# Patient Record
Sex: MALE | Employment: OTHER | URBAN - METROPOLITAN AREA
[De-identification: names, ages, dates, MRNs, and addresses within clinical notes are randomized per-mention and may not be internally consistent; named-entity substitution may affect disease eponyms.]

---

## 2018-02-12 RX ORDER — FLUTICASONE PROPIONATE 50 MCG
1 SPRAY, SUSPENSION (ML) NASAL DAILY
COMMUNITY
End: 2018-02-12 | Stop reason: ALTCHOICE

## 2018-02-12 RX ORDER — LOSARTAN POTASSIUM 50 MG/1
25 TABLET ORAL DAILY
COMMUNITY
End: 2018-07-18 | Stop reason: SDUPTHER

## 2018-02-12 RX ORDER — FUROSEMIDE 20 MG/1
20 TABLET ORAL DAILY
COMMUNITY
End: 2018-03-27 | Stop reason: SDUPTHER

## 2018-02-12 RX ORDER — PREDNISONE 10 MG/1
TABLET ORAL DAILY
COMMUNITY
End: 2018-03-15 | Stop reason: SDUPTHER

## 2018-02-12 RX ORDER — AZATHIOPRINE 50 MG/1
50 TABLET ORAL 3 TIMES DAILY
COMMUNITY
End: 2019-06-07 | Stop reason: ALTCHOICE

## 2018-02-12 RX ORDER — ZINC GLUCONATE 50 MG
50 TABLET ORAL DAILY
COMMUNITY
End: 2019-11-07 | Stop reason: ALTCHOICE

## 2018-02-12 RX ORDER — MULTIVITAMIN
1 CAPSULE ORAL DAILY
COMMUNITY
End: 2019-06-07 | Stop reason: ALTCHOICE

## 2018-02-12 RX ORDER — CETIRIZINE HYDROCHLORIDE 10 MG/1
10 TABLET ORAL DAILY
COMMUNITY

## 2018-02-21 ENCOUNTER — ANESTHESIA EVENT (OUTPATIENT)
Dept: PERIOP | Facility: HOSPITAL | Age: 73
End: 2018-02-21
Payer: COMMERCIAL

## 2018-02-21 ENCOUNTER — ANESTHESIA (OUTPATIENT)
Dept: PERIOP | Facility: HOSPITAL | Age: 73
End: 2018-02-21
Payer: COMMERCIAL

## 2018-02-21 ENCOUNTER — HOSPITAL ENCOUNTER (OUTPATIENT)
Facility: HOSPITAL | Age: 73
Setting detail: OUTPATIENT SURGERY
Discharge: HOME | End: 2018-02-21
Attending: INTERNAL MEDICINE | Admitting: INTERNAL MEDICINE
Payer: COMMERCIAL

## 2018-02-21 VITALS
DIASTOLIC BLOOD PRESSURE: 77 MMHG | HEART RATE: 84 BPM | SYSTOLIC BLOOD PRESSURE: 123 MMHG | HEIGHT: 70 IN | BODY MASS INDEX: 37.08 KG/M2 | OXYGEN SATURATION: 95 % | WEIGHT: 259 LBS | RESPIRATION RATE: 22 BRPM | TEMPERATURE: 97.6 F

## 2018-02-21 DIAGNOSIS — R19.5 OTHER FECAL ABNORMALITIES: ICD-10-CM

## 2018-02-21 PROCEDURE — 88305 TISSUE EXAM BY PATHOLOGIST: CPT | Performed by: PATHOLOGY

## 2018-02-21 PROCEDURE — 88305 TISSUE EXAM BY PATHOLOGIST: CPT | Performed by: INTERNAL MEDICINE

## 2018-02-21 RX ORDER — PROPOFOL 10 MG/ML
INJECTION, EMULSION INTRAVENOUS AS NEEDED
Status: DISCONTINUED | OUTPATIENT
Start: 2018-02-21 | End: 2018-02-21 | Stop reason: SURG

## 2018-02-21 RX ORDER — ACETAMINOPHEN 325 MG/1
650 TABLET ORAL EVERY 6 HOURS PRN
COMMUNITY

## 2018-02-21 RX ORDER — SODIUM CHLORIDE 9 MG/ML
20 INJECTION, SOLUTION INTRAVENOUS CONTINUOUS
Status: DISCONTINUED | OUTPATIENT
Start: 2018-02-21 | End: 2018-02-21 | Stop reason: HOSPADM

## 2018-02-21 RX ADMIN — SODIUM CHLORIDE 20 ML/HR: 0.9 INJECTION, SOLUTION INTRAVENOUS at 08:35

## 2018-02-21 RX ADMIN — PROPOFOL 100 MG: 10 INJECTION, EMULSION INTRAVENOUS at 10:00

## 2018-02-21 RX ADMIN — PROPOFOL 50 MG: 10 INJECTION, EMULSION INTRAVENOUS at 10:02

## 2018-02-21 RX ADMIN — PROPOFOL 50 MG: 10 INJECTION, EMULSION INTRAVENOUS at 10:10

## 2018-02-21 RX ADMIN — PROPOFOL 20 MG: 10 INJECTION, EMULSION INTRAVENOUS at 10:23

## 2018-02-21 RX ADMIN — PROPOFOL 50 MG: 10 INJECTION, EMULSION INTRAVENOUS at 10:15

## 2018-02-21 RX ADMIN — PROPOFOL 30 MG: 10 INJECTION, EMULSION INTRAVENOUS at 10:18

## 2018-02-21 RX ADMIN — PROPOFOL 50 MG: 10 INJECTION, EMULSION INTRAVENOUS at 10:05

## 2018-02-21 RX ADMIN — PROPOFOL 20 MG: 10 INJECTION, EMULSION INTRAVENOUS at 10:21

## 2018-02-21 RX ADMIN — PROPOFOL 30 MG: 10 INJECTION, EMULSION INTRAVENOUS at 10:27

## 2018-02-21 NOTE — OP NOTE
**** GI/ENDOSCOPY REPORT ****     PATIENT NAME: Yanni Strickland ------ VISIT ID:  Patient ID:   [id number] YOB: 1945     INTRODUCTION: Colonoscopy - A 68 male patient presents for an outpatient   Colonoscopy at [facility]  PREVIOUS COLONOSCOPY:     INDICATIONS: Fecal occult blood positive (cologuard)  CONSENT:  The benefits, risks, and alternatives to the procedure were   discussed and informed consent was obtained from the patient  PREPARATION: EKG, pulse, pulse oximetry and blood pressure were monitored   throughout the procedure  The patient was identified by myself both   verbally and by visual inspection of ID band  Airway Assessment   Classification: Airway class 2 - Visualization of the soft palate, fauces   and uvula  ASA Classification: See anesthesia record  MEDICATIONS: Anesthesia-check records     PROCEDURE:  The endoscope was passed without difficulty through the anus   under direct visualization and advanced to the cecum, confirmed by   appendiceal orifice and ileocecal valve  The scope was withdrawn and the   mucosa was carefully examined  The quality of the preparation was good  Cecal Intubation Time: 14 minutes(s) Scope Withdrawal Time: 13 minutes(s)     RECTAL EXAM: Normal rectal exam  No masses  FINDINGS:  Four flat and sessile polyps, measuring 5-9 mm in size, were   found in the ascending colon and ileocecal valve  All polyps were   completely removed by snare cautery polypectomy  The polyps were   retrieved  Four sessile polyps, measuring 4-9 mm in size, were found in   the transverse colon and descending colon  All polyps were completely   removed by snare cautery polypectomy  The polyps were retrieved  There was   evidence of mild diverticulosis in the descending colon and sigmoid colon  On retroflexed view, internal hemorrhoids were found  COMPLICATIONS: There were no complications       IMPRESSIONS: Four flat and sessile polyps found in the ascending colon and   ileocecal valve; all polyps removed by snare cautery polypectomy  Four   sessile polyps found in the transverse colon and descending colon; all   polyps removed by snare cautery polypectomy  Mild diverticulosis found in   the descending colon and sigmoid colon  Internal hemorrhoids  RECOMMENDATIONS: Resume regular diet as tolerated  Continue current   medications  Call if any signs or symptoms of abdominal pain, bleeding or   diverticulitis  Colonoscopy recommended in 5 years  ESTIMATED BLOOD LOSS: None  PATHOLOGY SPECIMENS: All polyps completely removed by snare cautery   polypectomy  All polyps completely removed by snare cautery polypectomy  PROCEDURE CODES: : Colonoscopy with snare polypectomy     ICD-9 Codes: 792 1 Nonspecific abnormal findings in stool contents 211 3   Benign neoplasm of colon 211 3 Benign neoplasm of colon 562 10   Diverticulosis of colon (without mention of hemorrhage)     ICD-10 Codes: R19 5 Other fecal abnormalities K63 5 Polyp of colon K63 5   Polyp of colon K57 Diverticular disease of intestine K64 9 Unspecified   hemorrhoids     PERFORMED BY: MARIELLA Pineda  on 02/21/2018  Version 1, electronically signed by MARIELLA Pineda  on 02/21/2018   at 10:43

## 2018-02-21 NOTE — ANESTHESIA PREPROCEDURE EVALUATION
Review of Systems/Medical History  Patient summary reviewed  Chart reviewed      Cardiovascular  Exercise tolerance: poor,  Hypertension controlled,    Pulmonary  Shortness of breath, Oxygen dependent Non Compliant,   Comment: Interstitial lung disease - non compliant with home O2     GI/Hepatic  Negative GI/hepatic ROS   Bowel prep       Negative  ROS        Endo/Other    Obesity  morbid obesity   GYN       Hematology  Negative hematology ROS      Musculoskeletal    Arthritis     Neurology   Psychology   Negative psychology ROS              Physical Exam    Airway    Mallampati score: II  TM Distance: >3 FB  Neck ROM: full     Dental   lower dentures and upper dentures,     Cardiovascular  Rhythm: regular, Rate: normal,     Pulmonary  Decreased breath sounds,     Other Findings        Anesthesia Plan  ASA Score- 3     Anesthesia Type- IV sedation with anesthesia with ASA Monitors  Additional Monitors:   Airway Plan:         Plan Factors-    Induction- intravenous  Postoperative Plan-     Informed Consent- Anesthetic plan and risks discussed with patient

## 2018-03-06 ENCOUNTER — TELEPHONE (OUTPATIENT)
Dept: PULMONOLOGY | Facility: CLINIC | Age: 73
End: 2018-03-06

## 2018-03-15 ENCOUNTER — OFFICE VISIT (OUTPATIENT)
Dept: FAMILY MEDICINE CLINIC | Facility: CLINIC | Age: 73
End: 2018-03-15
Payer: COMMERCIAL

## 2018-03-15 VITALS
DIASTOLIC BLOOD PRESSURE: 78 MMHG | SYSTOLIC BLOOD PRESSURE: 120 MMHG | WEIGHT: 258 LBS | TEMPERATURE: 99 F | HEIGHT: 70 IN | BODY MASS INDEX: 36.94 KG/M2 | HEART RATE: 84 BPM

## 2018-03-15 DIAGNOSIS — I10 ESSENTIAL HYPERTENSION: ICD-10-CM

## 2018-03-15 DIAGNOSIS — J84.9 ILD (INTERSTITIAL LUNG DISEASE) (HCC): Primary | ICD-10-CM

## 2018-03-15 DIAGNOSIS — J06.9 VIRAL URI WITH COUGH: ICD-10-CM

## 2018-03-15 PROBLEM — M19.90 OSTEOARTHRITIS: Status: ACTIVE | Noted: 2018-03-15

## 2018-03-15 PROCEDURE — 3074F SYST BP LT 130 MM HG: CPT | Performed by: FAMILY MEDICINE

## 2018-03-15 PROCEDURE — 3078F DIAST BP <80 MM HG: CPT | Performed by: FAMILY MEDICINE

## 2018-03-15 PROCEDURE — 99204 OFFICE O/P NEW MOD 45 MIN: CPT | Performed by: FAMILY MEDICINE

## 2018-03-15 RX ORDER — PROMETHAZINE HYDROCHLORIDE AND CODEINE PHOSPHATE 6.25; 1 MG/5ML; MG/5ML
5 SYRUP ORAL EVERY 4 HOURS PRN
Qty: 180 ML | Refills: 0 | Status: SHIPPED | OUTPATIENT
Start: 2018-03-15 | End: 2018-07-17 | Stop reason: ALTCHOICE

## 2018-03-15 RX ORDER — DOXYCYCLINE 100 MG/1
100 TABLET ORAL 2 TIMES DAILY
Qty: 20 TABLET | Refills: 0 | Status: SHIPPED | OUTPATIENT
Start: 2018-03-15 | End: 2018-03-25

## 2018-03-15 RX ORDER — PREDNISONE 10 MG/1
TABLET ORAL
Qty: 21 TABLET | Refills: 0 | Status: SHIPPED | OUTPATIENT
Start: 2018-03-15 | End: 2019-06-07 | Stop reason: ALTCHOICE

## 2018-03-15 NOTE — PROGRESS NOTES
Assessment/Plan:     Diagnoses and all orders for this visit:    ILD (interstitial lung disease) ([facility] [address] )  -     doxycycline (ADOXA) 100 MG tablet; Take 1 tablet (100 mg total) by mouth 2 (two) times a day for 10 days  -     predniSONE 10 mg tablet; 6 tabs on Day 1,5 tabs on Day 2,4 tabs on Day 3,3 tabs on Day 4,2 tabs on  Day 5 And 1 tab on Day 6  -     promethazine-codeine (PHENERGAN WITH CODEINE) 6 25-10 mg/5 mL syrup; Take 5 mL by mouth every 4 (four) hours as needed for cough      HTN: -- stable -- cont losartan  Viral URI-- Likely the cause of the issues today  Treatment as above  F/u with pulm  And f/u in 6 months for lakeisha valdez  Subjective:     Chief Complaint   Patient presents with    Cold Like Symptoms     x 3 days    Cough        Patient ID: Kim Rosario is a 68 y o  male  Here for uri symptoms  paintet is oxygen dependent and has ILD   Started 3 days ago  Has severe cough  +congestion   Hoarse voice  Is on long term steroids          The following portions of the patient's history were reviewed and updated as appropriate: allergies, current medications, past family history, past medical history, past social history, past surgical history and problem list     Review of Systems   Constitutional: Negative  HENT: Negative  Eyes: Negative  Respiratory: Negative  Cardiovascular: Negative  Gastrointestinal: Negative  Endocrine: Negative  Genitourinary: Negative  Musculoskeletal: Negative  Skin: Negative  Allergic/Immunologic: Negative  Neurological: Negative  Hematological: Negative  Psychiatric/Behavioral: Negative  All other systems reviewed and are negative          Objective:    Vitals:    03/15/18 1108   BP: 120/78   BP Location: Right arm   Patient Position: Sitting   Cuff Size: Standard   Pulse: 84   Temp: 99 °F (37 2 °C)   TempSrc: Tympanic   Weight: 117 kg (258 lb)   Height: 5' 10" (1 778 m)          Physical Exam   Constitutional: He is oriented to person, place, and time  He appears well-developed and well-nourished  No distress  HENT:   Head: Normocephalic  Right Ear: External ear normal    Left Ear: External ear normal    Nose: Nose normal    Mouth/Throat: Oropharynx is clear and moist    + congestion   Eyes: Conjunctivae and EOM are normal  Pupils are equal, round, and reactive to light  Right eye exhibits no discharge  Left eye exhibits no discharge  Neck: Normal range of motion  Cardiovascular: Normal rate, regular rhythm and normal heart sounds  Pulmonary/Chest: Effort normal and breath sounds normal    On oxygen  Decreased breath sounds b/l   Abdominal: Soft  Bowel sounds are normal  He exhibits no distension  There is no tenderness  Musculoskeletal: Normal range of motion  Neurological: He is alert and oriented to person, place, and time  No cranial nerve deficit  Skin: Skin is warm and dry  No rash noted  Psychiatric: He has a normal mood and affect  His behavior is normal  Judgment and thought content normal    Nursing note and vitals reviewed

## 2018-03-27 ENCOUNTER — OFFICE VISIT (OUTPATIENT)
Dept: FAMILY MEDICINE CLINIC | Facility: CLINIC | Age: 73
End: 2018-03-27
Payer: COMMERCIAL

## 2018-03-27 ENCOUNTER — EVALUATION (OUTPATIENT)
Dept: PHYSICAL THERAPY | Facility: CLINIC | Age: 73
End: 2018-03-27
Payer: COMMERCIAL

## 2018-03-27 ENCOUNTER — TELEPHONE (OUTPATIENT)
Dept: FAMILY MEDICINE CLINIC | Facility: CLINIC | Age: 73
End: 2018-03-27

## 2018-03-27 VITALS
SYSTOLIC BLOOD PRESSURE: 142 MMHG | HEART RATE: 86 BPM | RESPIRATION RATE: 16 BRPM | WEIGHT: 260 LBS | BODY MASS INDEX: 37.22 KG/M2 | DIASTOLIC BLOOD PRESSURE: 84 MMHG | OXYGEN SATURATION: 93 % | HEIGHT: 70 IN

## 2018-03-27 DIAGNOSIS — M54.2 NECK PAIN: Primary | ICD-10-CM

## 2018-03-27 DIAGNOSIS — J84.9 ILD (INTERSTITIAL LUNG DISEASE) (HCC): ICD-10-CM

## 2018-03-27 DIAGNOSIS — R60.9 EDEMA, UNSPECIFIED TYPE: ICD-10-CM

## 2018-03-27 PROCEDURE — 97110 THERAPEUTIC EXERCISES: CPT | Performed by: PHYSICAL THERAPIST

## 2018-03-27 PROCEDURE — 99214 OFFICE O/P EST MOD 30 MIN: CPT | Performed by: FAMILY MEDICINE

## 2018-03-27 PROCEDURE — 97161 PT EVAL LOW COMPLEX 20 MIN: CPT | Performed by: PHYSICAL THERAPIST

## 2018-03-27 PROCEDURE — G8979 MOBILITY GOAL STATUS: HCPCS | Performed by: PHYSICAL THERAPIST

## 2018-03-27 PROCEDURE — G8978 MOBILITY CURRENT STATUS: HCPCS | Performed by: PHYSICAL THERAPIST

## 2018-03-27 RX ORDER — FUROSEMIDE 20 MG/1
20 TABLET ORAL DAILY
Qty: 90 TABLET | Refills: 1 | Status: SHIPPED | OUTPATIENT
Start: 2018-03-27 | End: 2018-09-21 | Stop reason: SDUPTHER

## 2018-03-27 RX ORDER — PREDNISONE 10 MG/1
TABLET ORAL
Qty: 30 TABLET | Refills: 0 | Status: SHIPPED | OUTPATIENT
Start: 2018-03-27 | End: 2018-07-17 | Stop reason: SDUPTHER

## 2018-03-27 RX ORDER — CYCLOBENZAPRINE HCL 10 MG
10 TABLET ORAL
Qty: 14 TABLET | Refills: 0 | Status: SHIPPED | OUTPATIENT
Start: 2018-03-27 | End: 2018-07-17 | Stop reason: ALTCHOICE

## 2018-03-27 RX ORDER — ACETAMINOPHEN AND CODEINE PHOSPHATE 300; 30 MG/1; MG/1
1 TABLET ORAL EVERY 6 HOURS PRN
Qty: 30 TABLET | Refills: 0 | Status: SHIPPED | OUTPATIENT
Start: 2018-03-27 | End: 2018-07-17 | Stop reason: ALTCHOICE

## 2018-03-27 NOTE — PROGRESS NOTES
Assessment/Plan:     Diagnoses and all orders for this visit:    Neck pain  -     predniSONE 10 mg tablet; 4 for 3 days 3 for 3 days 2 for 3 days and 1 for 3 days  -     cyclobenzaprine (FLEXERIL) 10 mg tablet; Take 1 tablet (10 mg total) by mouth daily at bedtime  -     Ambulatory referral to Physical Therapy; Future  -     acetaminophen-codeine (TYLENOL #3) 300-30 mg per tablet; Take 1 tablet by mouth every 6 (six) hours as needed for moderate pain    Edema, unspecified type  -     furosemide (LASIX) 20 mg tablet; Take 1 tablet (20 mg total) by mouth daily    ILD (interstitial lung disease) ([facility] [address] )            Patient has severe neck spasm of his left trapezius  Patient is already on daily prednisone but will increase for a taper dose to back to what he is normally taking  Flexeril at night  Will stop tramadol and start Tylenol with codeine as needed  Called physical therapy and they will see him this afternoon as we will start physical therapy to help loosen up these muscles  Continue treatments for lung disease follow-up pulmonary       Subjective:     Chief Complaint   Patient presents with    Pinched Nerve     in neck for 2 months        Patient ID: Suri Dumas is a 68 y o  male  Here for 1-2 months of left-sided neck and shoulder pain  Feels numbness and tingling down arm  Decreased range of motion of neck  Worse with coughing patient has chronic lung disease and coughs and this is exacerbating the problem  Also read needs refill of Lasix for edema        The following portions of the patient's history were reviewed and updated as appropriate: allergies, current medications, past family history, past medical history, past social history, past surgical history and problem list     Review of Systems   Constitutional: Negative  HENT: Negative  Eyes: Negative  Respiratory: Negative  Cardiovascular: Negative  Gastrointestinal: Negative  Endocrine: Negative  Genitourinary: Negative  Musculoskeletal: Positive for arthralgias and back pain  Skin: Negative  Allergic/Immunologic: Negative  Neurological: Negative  Hematological: Negative  Psychiatric/Behavioral: Negative  All other systems reviewed and are negative  Objective:    Vitals:    03/27/18 0902   BP: 142/84   BP Location: Left arm   Patient Position: Sitting   Cuff Size: Standard   Pulse: 86   Resp: 16   SpO2: 93%   Weight: 118 kg (260 lb)   Height: 5' 10" (1 778 m)          Physical Exam   Constitutional: He is oriented to person, place, and time  He appears well-developed and well-nourished  No distress  HENT:   Head: Normocephalic  Right Ear: External ear normal    Left Ear: External ear normal    Nose: Nose normal    Mouth/Throat: Oropharynx is clear and moist    Eyes: Conjunctivae and EOM are normal  Pupils are equal, round, and reactive to light  Right eye exhibits no discharge  Left eye exhibits no discharge  Neck: Normal range of motion  Cardiovascular: Normal rate, regular rhythm and normal heart sounds  Pulmonary/Chest: Effort normal and breath sounds normal    Abdominal: Soft  Bowel sounds are normal  He exhibits no distension  There is no tenderness  Musculoskeletal: Normal range of motion  Extremely tight and spasms left-sided neck  Decreased range of motion of neck   Neurological: He is alert and oriented to person, place, and time  No cranial nerve deficit  Skin: Skin is warm and dry  No rash noted  Psychiatric: He has a normal mood and affect  His behavior is normal  Judgment and thought content normal    Nursing note and vitals reviewed

## 2018-03-27 NOTE — PROGRESS NOTES
PT Evaluation     Today's date: 3/27/2018  Patient name: Dereck Duran  : 1945  MRN: [id number]  Referring provider: Gary Rodgers DO  Dx:   Encounter Diagnosis     ICD-10-CM    1  Neck pain M54 2 Ambulatory referral to Physical Therapy                  Assessment  Impairments: abnormal muscle firing, abnormal muscle tone, abnormal or restricted ROM, abnormal movement, impaired physical strength, lacks appropriate home exercise program, pain with function and scapular dyskinesis    Assessment details: Patient is a 68y o  year old male presenting to physical therapy with left shoulder/neck pain  Patient presents with pain, decreased strength, decreased ROM, and decreased tolerance to activity  Patient would benefit from skilled physical therapy services to address these impairments and to maximize function  Thank you for the referral     Understanding of Dx/Px/POC: excellent  Goals  Impairment Goals:  1 ) Pt will have decreased sx at rest by 50% in 2-4 weeks  2 ) Pt will have improved cervical active rotation range of motion by 10 degrees B in 4-6 weeks  3 ) Pt will have decreased tenderness to palpation to left UT by 50% in 3-4 weeks  Functional Goals:  1 ) Pt will be independent in their home exercise program in 1 week  2 ) Pt will be able to turn head in car to see blind spot in 4-6 weeks  3 ) Pt will be able to complete all ADL's without neck pain in 6-8 weeks  4 ) Pt will have an improved FOTO score of 55/100 in 6-8 weeks        Plan  Patient would benefit from: skilled PT  Planned modality interventions: TENS  Planned therapy interventions: joint mobilization, manual therapy, neuromuscular re-education, patient education, postural training, strengthening, therapeutic exercise, home exercise program and stretching  Frequency: 2x week  Duration in weeks: 8  Treatment plan discussed with: patient  Plan details: POC Ends: 18        Subjective Evaluation    History of Present Illness  Mechanism of injury: Pt presents to physical therapy with left sided neck pain and tightness that became unbearable a few months ago  Pt reports that today Dr Agustín Daniels gave him muscle relaxors and an increase in prednisone to control his sx  Pt has interstitial lung disease, notes he has been coughing and this has been making his neck and shoulder pain worse  Pain starts in the left upper shoulder region, at times it travels up into his neck but does not travel down past the elbow, described as a deep achy pain  Pt denies numbness and tingling, unrelenting night pain or weakness  Aggs: overhead activity, shaving, getting a shirt on, falling asleep, turning head side to side  Eases: Heat ice, medications  Left hand is his dominant hand  Pain  Current pain ratin  At best pain ratin  At worst pain rating: 10  Quality: dull ache  Relieving factors: heat, medications, ice and relaxation  Aggravating factors: overhead activity, lifting and sitting  Progression: worsening          Objective     Static Posture     Shoulders  Rounded  Scapulae  Left elevated  Postural Observations  Seated posture: poor  Standing posture: poor  Correction of posture: makes symptoms better        Palpation   Left   Tenderness of the anterior deltoid, levator scapulae, rhomboids and upper trapezius  Tenderness     Left Shoulder   Tenderness in the scapular spine  Active Range of Motion   Cervical/Thoracic Spine   Cervical    Flexion: WFL  Extension: Neck active extension: MAX  with pain  Left lateral flexion: 15 degrees with pain  Right lateral flexion: 22 degrees with pain  Left rotation: 32 degrees with pain  Right rotation: 53 degrees with pain  Left Shoulder   Flexion: 90 degrees with pain  Abduction: 135 degrees with pain    Joint Play     Additional Joint Play Details  NT this visit due to pain levels      Strength/Myotome Testing     Left Shoulder     Planes of Motion   Flexion: 3+ (Pain noted in left shoulder during MMT)   Abduction: 3+   External rotation at 0°: 3+   Internal rotation at 0°: 3+     Right Shoulder     Planes of Motion   Flexion: 4-   Abduction: 4-   External rotation at 0°: 4-   Internal rotation at 0°: 4-     Tests   Cervical     Left   Positive cervical distraction and cervical compression test       Right   Positive cervical distraction and cervical compression test      General Comments     Shoulder Comments   Tests and measures limited this visit due to pain levels this session          Precautions: Interstitial Lung Disease, HTN, L RTC Repair ~25 years ago    Daily Treatment Diary       Manuals             STM L UT                                                    Exercise Diary              Scapular retractions 10x10''            Seated chin tuck 10x10''            Self UT/Lev stretch 5x15''                                                                                                                                                                                                                                                                   Modalities

## 2018-03-28 NOTE — TELEPHONE ENCOUNTER
[facility]'t travel to [place]  [facility] isnt downstairs anymore today  -need to verify the "home bound" status    He will need [facility]

## 2018-03-29 ENCOUNTER — TELEPHONE (OUTPATIENT)
Dept: FAMILY MEDICINE CLINIC | Facility: CLINIC | Age: 73
End: 2018-03-29

## 2018-03-29 NOTE — TELEPHONE ENCOUNTER
You had set patient up with Physical Therapy down stairs  Camila Earl saw him on 3/27/18  Wife called later that night requesting a home health agency to come to their house and do physical therapy  I haven't been able to reach the patient or his wife since BUT I spoke with Armando Allen in PT today  He said that he spoke with patient and informed them (after the family verbalized home physical therapy being the next step) that Yod[facility] Hernandez might not qualify because he is active in his community and not home bound  The family also didn't like the cost of the $40 co pay and getting [facility] Hernandez out of the house for each PT visit  He cannot use [facility], it would need to go through [facility]  If you feel like he is qualified, I can continue the process of setting him up

## 2018-03-30 DIAGNOSIS — M54.2 NECK PAIN: Primary | ICD-10-CM

## 2018-03-30 NOTE — TELEPHONE ENCOUNTER
Spoke to wife and Oxana about arranging home physical therapy for Radha  Faxed PT order to [facility] who should start next week  They will call the wife to discuss      DJBLANCO

## 2018-04-03 ENCOUNTER — OFFICE VISIT (OUTPATIENT)
Dept: PHYSICAL THERAPY | Facility: CLINIC | Age: 73
End: 2018-04-03
Payer: COMMERCIAL

## 2018-04-03 DIAGNOSIS — M54.2 NECK PAIN: Primary | ICD-10-CM

## 2018-04-03 PROCEDURE — 97140 MANUAL THERAPY 1/> REGIONS: CPT | Performed by: PHYSICAL THERAPIST

## 2018-04-03 PROCEDURE — G8982 BODY POS GOAL STATUS: HCPCS | Performed by: PHYSICAL THERAPIST

## 2018-04-03 PROCEDURE — G8981 BODY POS CURRENT STATUS: HCPCS | Performed by: PHYSICAL THERAPIST

## 2018-04-03 PROCEDURE — 97112 NEUROMUSCULAR REEDUCATION: CPT | Performed by: PHYSICAL THERAPIST

## 2018-04-03 PROCEDURE — 97010 HOT OR COLD PACKS THERAPY: CPT | Performed by: PHYSICAL THERAPIST

## 2018-04-03 PROCEDURE — 97110 THERAPEUTIC EXERCISES: CPT | Performed by: PHYSICAL THERAPIST

## 2018-04-03 NOTE — PROGRESS NOTES
Daily Note     Today's date: 4/3/2018  Patient name: Natasha Garcia  : 1945  MRN: [id number]  Referring provider: Gurinder Bernal DO  Dx:   Encounter Diagnosis     ICD-10-CM    1  Neck pain M54 2                   Subjective: Pt reports that his tension and pain in his neck has greatly reduced  He has been completing his exercises and explains that they have been helping reduce his pain  Objective: See treatment diary below    Precautions: Interstitial Lung Disease, HTN, L RTC Repair ~25 years ago     Daily Treatment Diary         Manuals  3/27 4/3                    TPR B UT    Y                    STM B cervical paraspinals    Y                                                                   Exercise Diary                        Scapular retractions 10x10''  10x10''                   Seated chin tuck 10x10''  10x10''                   Self UT/Lev stretch 5x15''  3x30''                   Standing pec stretch   5x20''                                                                                                                                                                                                                                                                                                                                                                                                                                                                    Modalities                        MH L shoulder   Y                                                   Assessment: Tolerated treatment well  Patient demonstrated fatigue post treatment  Pt given verbal cueing to correct deep cervical neck flexors and with seated posture during self stretches  Pt given self manual PT options at home, updated HEP to include standing pec stretch  **Pts wife called to confirm they qualify for home PT and are self-discharging from OP PT at this time  **    Plan: Continue per plan of care

## 2018-04-05 ENCOUNTER — APPOINTMENT (OUTPATIENT)
Dept: PHYSICAL THERAPY | Facility: CLINIC | Age: 73
End: 2018-04-05
Payer: COMMERCIAL

## 2018-05-02 ENCOUNTER — OFFICE VISIT (OUTPATIENT)
Dept: PULMONOLOGY | Facility: HOSPITAL | Age: 73
End: 2018-05-02
Payer: COMMERCIAL

## 2018-05-02 VITALS
HEART RATE: 91 BPM | TEMPERATURE: 97.3 F | SYSTOLIC BLOOD PRESSURE: 138 MMHG | HEIGHT: 71 IN | OXYGEN SATURATION: 92 % | WEIGHT: 260 LBS | DIASTOLIC BLOOD PRESSURE: 80 MMHG | RESPIRATION RATE: 14 BRPM | BODY MASS INDEX: 36.4 KG/M2

## 2018-05-02 DIAGNOSIS — J84.9 ILD (INTERSTITIAL LUNG DISEASE) (HCC): Primary | ICD-10-CM

## 2018-05-02 DIAGNOSIS — R06.83 SNORING: ICD-10-CM

## 2018-05-02 DIAGNOSIS — R06.81 WITNESSED EPISODE OF APNEA: ICD-10-CM

## 2018-05-02 DIAGNOSIS — J67.9 HYPERSENSITIVITY PNEUMONITIS (HCC): ICD-10-CM

## 2018-05-02 PROCEDURE — 94618 PULMONARY STRESS TESTING: CPT | Performed by: INTERNAL MEDICINE

## 2018-05-02 PROCEDURE — 99205 OFFICE O/P NEW HI 60 MIN: CPT | Performed by: INTERNAL MEDICINE

## 2018-05-02 RX ORDER — FLUTICASONE PROPIONATE 50 MCG
1 SPRAY, SUSPENSION (ML) NASAL DAILY
Refills: 3 | COMMUNITY
Start: 2018-04-25

## 2018-05-02 NOTE — PROGRESS NOTES
Pulmonary Consultation   Lisa Casillas 68 y o  male MRN: [id number]      Reason for consultation: establish pulmonary follow up in the [facility]    Requesting physician: Dr Iram Andrews    Assessment/Plan  67 y/o M with PMHx of HTN, ILD who comes in to start with outpatient pulmonary follow up in the [facility]  1   Chronic hypoxic respiratory failure secondary to chronic progressive hypersensitivity pneumonitis with bronchiectasis - biopsy proven  Currently on chronic Imuran and Prednisone therapy  Previously on Cellcept with little help   3 recent viral illness causing some progression of disease  Moderate restriction on previously PFTs  6 minute walk demonstrated the higher oxygen requirement at  4L of oxygen with exertion to maintain sat  Distance walk also shorter  -  Continue 2L of oxygen support through the day when not active  Increase to 4L with exertion  I encouraged him to check oxygen levels with ambulation to understand his limitations      -  Start pulmonary rehab therapy to help with his oxygen requirement      -  Check northeast panel and IgE      -  Obtain records from [facility] for additional testing performed  -  Continue Imuran at 150mg Daily      -  Continue Prednisone 10mg Daily      -  Previously on Dulera  LABA/ICS would help with bronchiectasis but does have a smoking history  Sample of stiolto to try but also gave sample of Symbicort  Will trial one at a time for 2 weeks  May benefit from triple therapy  -  For his cough he has tessalon perrles at home that he has not tried  I encouraged him to try that  If that is not helpful, he may benefit from neurontin  2   Snoring/witnessed apneas -  Mallampati class 4, Body mass index is 36 78 kg/m² ,  He is at risk for obstructive sleep apnea based on STOP [employer] survey        -  I discussed treatment options involved with obstructive sleep apnea      -  I also discussed the importance of treating it with his underlying lung disease and the other risks of leaving sleep apnea untreated including hypertension, heart failure, arrhythmia, MI and stroke  -  We will discuss again at next visit as he is not really interested in pursuing this at this time  History of Present Illness   HPI:  Blessing Alfonso is a 68 y o  male with PMHx as below who comes in for follow up of chronic lung disease  Mr Mar Saleem was diagnosed with chronic hypersensitivity pneumonitis at [facility] and had been following with a pulmonologist there  He had undergone lung biopsy and was being treated for chronic hypersensitivity pneumonitis with prednisone and cellcept originally  He had not been having a lot of success with these agents so there was a recent switch to imuran instead  Despite their efforts his dyspnea and oxygen requirement was worsening  He now was requiring 2L of oxygen to maintain saturation  He states that he is not feeling any better with that  In addition, over the past year he has had 3 flairs secondary to viral infections and as a result he has noted a steady decline in his respiratory status  He admits that his cough is getting worse and he gets significantly more short of breath with minimal exertion  He states the cough is dry and he does not feel he has lots of chest congestion  He does however admit to stuffy nose and sinuses  He has had allergies in the past and will use Zyrtec and Flonase to help with those symptoms  His wife notes snoring, awakenings with gasping, and witnessed apneas  He denies excessive daytime sleepiness  ROS:   Review of Systems   Constitutional: Positive for activity change, fatigue and unexpected weight change  Negative for diaphoresis  HENT: Negative  Eyes: Negative  Respiratory: Positive for shortness of breath and wheezing  Negative for chest tightness  Cardiovascular: Negative  Gastrointestinal: Negative  Endocrine: Negative      Genitourinary: Negative  Musculoskeletal: Negative  Skin: Negative  Neurological: Negative  Hematological: Negative  Psychiatric/Behavioral: Negative  Historical Information   Past Medical History:   Diagnosis Date    Hearing loss     Hypertension     Interstitial lung disease ([address] )     Lung disease     Osteoarthritis involving multiple joints on both sides of body     Pulmonary fibrosis (HCC)      Past Surgical History:   Procedure Laterality Date    CARPAL TUNNEL RELEASE      CATARACT EXTRACTION      LUNG BIOPSY      MN COLONOSCOPY FLX DX W/COLLJ SPEC WHEN PFRMD N/A 2/21/2018    Procedure: COLONOSCOPY;  Surgeon: Heather Paul MD;  Location:  MAIN OR;  Service: Gastroenterology    SHOULDER SURGERY Left     TONSILLECTOMY      ULNAR COLLATERAL LIGAMENT RECONSTRUCTION      ulnar artery removed     Family History   Problem Relation Age of Onset    Cancer Mother     Cholecystitis Father     Drug abuse Brother      Social History     Social History    Marital status: Unknown     Spouse name: N/A    Number of children: N/A    Years of education: N/A     Occupational History    Not on file  Social History Main Topics    Smoking status: Former Smoker     Packs/day: 1 00     Years: 0 00     Types: Cigarettes     Quit date: 2/12/2009    Smokeless tobacco: Never Used    Alcohol use Yes      Comment: social    Drug use: No    Sexual activity: Yes     Other Topics Concern    Not on file     Social History Narrative    No narrative on file       Occupational History: worked in [employer], previously into welding, 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  Authorizing Provider   acetaminophen (TYLENOL) 325 mg tablet Take 650 mg by mouth every 6 (six) hours as needed for mild pain   Yes Historical Provider, MD   acetaminophen-codeine (TYLENOL #3) 300-30 mg per tablet Take 1 tablet by mouth every 6 (six) hours as needed for moderate pain 3/27/18  Yes Chris Leong, DO   Albuterol Sulfate 108 (90 Base) MCG/ACT AEPB Inhale 2 puffs every 4 (four) hours   Yes Historical Provider, MD   Ascorbic Acid 500 MG CHEW Chew daily   Yes Historical Provider, MD   azaTHIOprine (IMURAN) 50 mg tablet Take 50 mg by mouth 3 (three) times a day     Yes Historical Provider, MD   cetirizine (ZyrTEC) 10 mg tablet Take 10 mg by mouth daily   Yes Historical Provider, MD   cyclobenzaprine (FLEXERIL) 10 mg tablet Take 1 tablet (10 mg total) by mouth daily at bedtime 3/27/18  Yes Chris Leong, DO   fluticasone [place]) 50 mcg/act nasal spray 1 spray daily 4/25/18  Yes Historical Provider, MD   furosemide (LASIX) 20 mg tablet Take 1 tablet (20 mg total) by mouth daily 3/27/18  Yes Chris Leong,    losartan (COZAAR) 50 mg tablet Take 25 mg by mouth daily   Yes Historical Provider, MD   Multiple Vitamin (MULTIVITAMIN) capsule Take 1 capsule by mouth daily   Yes Historical Provider, MD   predniSONE 10 mg tablet 6 tabs on Day 1,5 tabs on Day 2,4 tabs on Day 3,3 tabs on Day 4,2 tabs on  Day 5 And 1 tab on Day 6 3/15/18  Yes Chris Leong, DO   predniSONE 10 mg tablet 4 for 3 days 3 for 3 days 2 for 3 days and 1 for 3 days 3/27/18  Yes Chris Leong, DO   promethazine-codeine (PHENERGAN WITH CODEINE) 6 25-10 mg/5 mL syrup Take 5 mL by mouth every 4 (four) hours as needed for cough 3/15/18  Yes Chris Leong, DO   Psyllium ([employer] NATURAL DAILY FIBER) 48 57 % POWD Take by mouth   Yes Historical Provider, MD   zinc gluconate 50 mg tablet Take 50 mg by mouth daily   Yes Historical Provider, MD       Vitals:   Blood pressure 138/80, pulse 91, temperature (!) 97 3 °F (36 3 °C), resp  rate 14, height 5' 10 5" (1 791 m), weight 118 kg (260 lb), SpO2 92 %  , 2L NC, Body mass index is 36 78 kg/m²       Physical Exam  General: Obese, Awake alert and oriented x 3, conversant without conversational dyspnea, NAD, normal affect  HEENT:  PERRL, Sclera noninjected, nonicteric OU, Nares patent,  no craniofacial abnormalities, Mucous membranes, moist, no oral lesions, normal dentition, Mallampati class 4  NECK: Trachea midline, no accessory muscle use, no stridor, no cervical or supraclavicular adenopathy, JVP not elevated  CARDIAC: Reg, single s1/S2, no m/r/g  PULM: CTA bilaterally no wheezing, rhonchi or rales  ABD: Normoactive bowel sounds, soft nontender, nondistended, no rebound, no rigidity, no guarding  EXT: No cyanosis, no clubbing, no edema, normal capillary refill  NEURO: no focal neurologic deficits, AAOx3, moving all extremities appropriately    PFTs:  The most recent pulmonary function tests were reviewed  From [place]  PFTS:  Date    FEV1           FVC      FEV1/FVC TLC       DLCO   5/2013 2 87 (83%) 3 56 (76%) 81% 5 56 (76%) 19 14 (77%)   8/9/2013 3 08 (95) 3 73 (95) 83    17 59 (52)   5/15/2015 2 92 (88) 3 60 (80) 81 5 07 (71) (46)   5/10/2016 2 86 (86) 3 29 (72) 87 4 63 (67) 14 76 (49)   11/21/2016 2 60 (68) 3 12 (68) 83 4 75 (64) 16 18 (61)   3/20/2017 2 52 (76) 2 97 (65) 85 4 31 (58) 16 46 (63)   6/9/17 2 44 (74) 2 85 (63) 86 3 90 (53) 15 42 (64)   10/2/17 2 56 (78) 3 02 (67) 78 4 73 (64) 15 20 (64)      6 Minute Walk:  Distance Saturation FiO2   5/24/2013 1258 ft 100 to 96 RA   8/9/2013 1544 ft 100 to 94 RA   5/15/2015 1574 ft 100 to 89 RA   11/21/2016 1168 ft 100 to 92 RA   3/20/2017 1200 ft 100 to 92 RA   6/9/17 1244 ft 100 to 93 2 L   10/2/17 1218 ft 100 to 97 2 L     Imaging  I personally reviewed the images on the [facility] system pertinent to today's visitCT chest 1/4/18 -  Overall stable compared to previous CT  Radiology read "acute right eight rib fracture  Multifocal peripheral interstitial thickening, honeycombing, traction bronchiectasis compatible with history of chronic interstitial lung disease   No focal consolidation or mass demonstrated "       Sleep studies:  None                                       DO Iron [address] Sleep Physician

## 2018-05-02 NOTE — PATIENT INSTRUCTIONS
Chronic Lung Disease and Infection Prevention   WHAT YOU NEED TO KNOW:   When you a have a chronic lung condition, infections such a cold or the flu, may become serious  These infections can cause more damage to your lungs  One episode of pneumonia puts you at risk to have more respiratory infections in the future  DISCHARGE INSTRUCTIONS:   Prevent respiratory infections:   · Wash your hands  This is the most important thing you can do to prevent infection  Wash your hands several times each day  Encourage everyone in your house to wash their hands with soap and water after they use the bathroom  Everyone should also wash their hands after they change a child's diaper and before they prepare or eat food  ¨ After you wash your hands, gently rub them dry with a clean towel or paper towel  Hold a paper towel in your hand while you turn off the water tap  When you leave the bathroom, hold a paper towel in your hand as you touch the door handle  ¨ Use an alcohol-based hand rub for cleaning your hands if there is no water  Carry it with you when you leave the house  Before using a hand rub, wipe dirt off of your hands as much as you can  Your hands should be dry before hand rub is used  Rub your hands together until all of the hand rub liquid has dried up  · Avoid crowds during flu season  Flu season is from late October to the middle of March  Do not have close contact with someone who is sick  Stay 3 to 6 feet away from people when you are in public  Ask friends and family to visit only when they are not sick  · Get vaccinated  Flu and pneumonia vaccines can help prevent infection  Ask your healthcare provider for more information about vaccines  · Cover your mouth when you cough or sneeze  Wash and dry your hands after each cough or sneeze  · Do not smoke  If you smoke, it is never too late to quit  Ask for information if you need help quitting  · Build resistance to infection  Eat a variety of healthy foods such as fruits, vegetables, and whole-grain foods  Choose dairy foods, meat, and other protein foods that are low in fat  Get plenty of sleep and physical activity  Work with your healthcare provider to develop an exercise plan that is right for you  · Protect your mouth from germs that lead to infection  Brush your teeth at least 2 times per day  See your dentist at least every 6 months  © 2017 [address] [employer] Information is for End User's use only and may not be sold, redistributed or otherwise used for commercial purposes  All illustrations and images included in CareNotes® are the copyrighted property of [employer]  or [employer]  The above information is an  only  It is not intended as medical advice for individual conditions or treatments  Talk to your doctor, nurse or pharmacist before following any medical regimen to see if it is safe and effective for you

## 2018-05-02 NOTE — LETTER
May 2, 2018     Garcia Ramirez DO  [address]  [address] [facility] [address]  [place] [address]    Patient: Cody Valladares   YOB: 1945   Date of Visit: 5/2/2018       Dear Dr Sellers Re: Thank you for referring Cody Valladares to me for evaluation  Below are my notes for this consultation  If you have questions, please do not hesitate to call me  I look forward to following your patient along with you  Sincerely,        Angela Thomas DO        CC: No Recipients  Angela Thomas DO  5/2/2018  1:53 PM  Sign at close encounter  Pulmonary Consultation   Cody Valladares 68 y o  male MRN: [id number]      Reason for consultation: establish pulmonary follow up in the [facility]    Requesting physician: Dr Verito Bowman    Assessment/Plan  69 y/o M with PMHx of HTN, ILD who comes in to start with outpatient pulmonary follow up in the [facility]  1   Chronic hypoxic respiratory failure secondary to chronic progressive hypersensitivity pneumonitis with bronchiectasis - biopsy proven  Currently on chronic Imuran and Prednisone therapy  Previously on Cellcept with little help   3 recent viral illness causing some progression of disease  Moderate restriction on previously PFTs  6 minute walk demonstrated the need for 4L of oxygen with exertion to maintain sat  -  Continue 2L of oxygen support through the day when not active  Increase to 4L with exertion  I encouraged him to check oxygen levels with ambulation to understand his limitations      -  Start pulmonary rehab therapy to help with his oxygen requirement      -  Check northeast panel and IgE      -  Obtain records from [facility] for additional testing performed  -  Continue Imuran at 150mg Daily      -  Continue Prednisone 10mg Daily      -  Previously on Dulera  LABA/ICS would help with bronchiectasis but does have a smoking history  Sample of stiolto to try but also gave sample of Symbicort  Will trial one at a time for 2 weeks  May benefit from triple therapy  -  For his cough he has tessalon perrles at home that he has not tried  I encouraged him to try that  If that is not helpful, he may benefit from neurontin  2   Snoring/witnessed apneas -  Mallampati class 4, Body mass index is 36 78 kg/m² ,  He is at risk for obstructive sleep apnea based on [employer] survey  -  I discussed treatment options involved with obstructive sleep apnea      -  I also discussed the importance of treating it with his underlying lung disease and the other risks of leaving sleep apnea untreated including hypertension, heart failure, arrhythmia, MI and stroke  -  We will discuss again at next visit as he is not really interested in pursuing this at this time  History of Present Illness   HPI:  Felicia Sinha is a 68 y o  male with PMHx as below who comes in for follow up of chronic lung disease  Mr Daphnie Gerardo was diagnosed with chronic hypersensitivity pneumonitis at [facility] and had been following with a pulmonologist there  He had undergone lung biopsy and was being treated for chronic hypersensitivity pneumonitis with prednisone and cellcept originally  He had not been having a lot of success with these agents so there was a recent switch to imuran instead  Despite their efforts his dyspnea and oxygen requirement was worsening  He now was requiring 2L of oxygen to maintain saturation  He states that he is not feeling any better with that  In addition, over the past year he has had 3 flairs secondary to viral infections and as a result he has noted a steady decline in his respiratory status  He admits that his cough is getting worse and he gets significantly more short of breath with minimal exertion  He states the cough is dry and he does not feel he has lots of chest congestion  He does however admit to stuffy nose and sinuses  He has had allergies in the past and will use Zyrtec and Flonase to help with those symptoms  His wife notes snoring, awakenings with gasping, and witnessed apneas  He denies excessive daytime sleepiness  ROS:   Review of Systems   Constitutional: Positive for activity change, fatigue and unexpected weight change  Negative for diaphoresis  HENT: Negative  Eyes: Negative  Respiratory: Positive for shortness of breath and wheezing  Negative for chest tightness  Cardiovascular: Negative  Gastrointestinal: Negative  Endocrine: Negative  Genitourinary: Negative  Musculoskeletal: Negative  Skin: Negative  Neurological: Negative  Hematological: Negative  Psychiatric/Behavioral: Negative  Historical Information   Past Medical History:   Diagnosis Date    Hearing loss     Hypertension     Interstitial lung disease ([address] )     Lung disease     Osteoarthritis involving multiple joints on both sides of body     Pulmonary fibrosis (HCC)      Past Surgical History:   Procedure Laterality Date    CARPAL TUNNEL RELEASE      CATARACT EXTRACTION      LUNG BIOPSY      [place] COLONOSCOPY FLX DX W/COLLJ SPEC WHEN PFRMD N/A 2/21/2018    Procedure: COLONOSCOPY;  Surgeon: Robb Street MD;  Location: [facility];  Service: Gastroenterology    SHOULDER SURGERY Left     TONSILLECTOMY      ULNAR COLLATERAL LIGAMENT RECONSTRUCTION      ulnar artery removed     Family History   Problem Relation Age of Onset    Cancer Mother     Cholecystitis Father     Drug abuse Brother      Social History     Social History    Marital status: Unknown     Spouse name: N/A    Number of children: N/A    Years of education: N/A     Occupational History    Not on file       Social History Main Topics    Smoking status: Former Smoker     Packs/day: 1 00     Years: 0 00     Types: Cigarettes     Quit date: 2/12/2009    Smokeless tobacco: Never Used    Alcohol use Yes      Comment: social    Drug use: No    Sexual activity: Yes     Other Topics Concern    Not on file     Social History Narrative    No narrative on file       Occupational History: 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  Authorizing Provider   acetaminophen (TYLENOL) 325 mg tablet Take 650 mg by mouth every 6 (six) hours as needed for mild pain   Yes Historical Provider, MD   acetaminophen-codeine (TYLENOL #3) 300-30 mg per tablet Take 1 tablet by mouth every 6 (six) hours as needed for moderate pain 3/27/18  Yes Chris Leong, DO   Albuterol Sulfate 108 (90 Base) MCG/ACT AEPB Inhale 2 puffs every 4 (four) hours   Yes Historical Provider, MD   Ascorbic Acid 500 MG CHEW Chew daily   Yes Historical Provider, MD   azaTHIOprine (IMURAN) 50 mg tablet Take 50 mg by mouth 3 (three) times a day     Yes Historical Provider, MD   cetirizine (ZyrTEC) 10 mg tablet Take 10 mg by mouth daily   Yes Historical Provider, MD   cyclobenzaprine (FLEXERIL) 10 mg tablet Take 1 tablet (10 mg total) by mouth daily at bedtime 3/27/18  Yes Chris Leong,    fluticasone [place]) 50 mcg/act nasal spray 1 spray daily 4/25/18  Yes Historical Provider, MD   furosemide (LASIX) 20 mg tablet Take 1 tablet (20 mg total) by mouth daily 3/27/18  Yes Chris Leong,    losartan (COZAAR) 50 mg tablet Take 25 mg by mouth daily   Yes Historical Provider, MD   Multiple Vitamin (MULTIVITAMIN) capsule Take 1 capsule by mouth daily   Yes Historical Provider, MD   predniSONE 10 mg tablet 6 tabs on Day 1,5 tabs on Day 2,4 tabs on Day 3,3 tabs on Day 4,2 tabs on  Day 5 And 1 tab on Day 6 3/15/18  Yes Chris Leong, DO   predniSONE 10 mg tablet 4 for 3 days 3 for 3 days 2 for 3 days and 1 for 3 days 3/27/18  Yes Chris Leong, DO   promethazine-codeine (PHENERGAN WITH CODEINE) 6 25-10 mg/5 mL syrup Take 5 mL by mouth every 4 (four) hours as needed for cough 3/15/18  Yes Chris Leong, DO   Psyllium ([employer] NATURAL DAILY FIBER) 48 57 % POWD Take by mouth   Yes Historical Provider, MD   zinc gluconate 50 mg tablet Take 50 mg by mouth daily   Yes Historical Provider, MD       Vitals:   Blood pressure 138/80, pulse 91, temperature (!) 97 3 °F (36 3 °C), resp  rate 14, height 5' 10 5" (1 791 m), weight 118 kg (260 lb), SpO2 92 %  , 2L NC, Body mass index is 36 78 kg/m²  Physical Exam  General: Obese, Awake alert and oriented x 3, conversant without conversational dyspnea, NAD, normal affect  HEENT:  PERRL, Sclera noninjected, nonicteric OU, Nares patent,  no craniofacial abnormalities, Mucous membranes, moist, no oral lesions, normal dentition, Mallampati class 4  NECK: Trachea midline, no accessory muscle use, no stridor, no cervical or supraclavicular adenopathy, JVP not elevated  CARDIAC: Reg, single s1/S2, no m/r/g  PULM: CTA bilaterally no wheezing, rhonchi or rales  ABD: Normoactive bowel sounds, soft nontender, nondistended, no rebound, no rigidity, no guarding  EXT: No cyanosis, no clubbing, no edema, normal capillary refill  NEURO: no focal neurologic deficits, AAOx3, moving all extremities appropriately    PFTs:  The most recent pulmonary function tests were reviewed    From [place]  PFTS:  Date    FEV1           FVC      FEV1/FVC TLC       DLCO   5/2013 2 87 (83%) 3 56 (76%) 81% 5 56 (76%) 19 14 (77%)   8/9/2013 3 08 (95) 3 73 (95) 83    17 59 (52)   5/15/2015 2 92 (88) 3 60 (80) 81 5 07 (71) (46)   5/10/2016 2 86 (86) 3 29 (72) 87 4 63 (67) 14 76 (49)   11/21/2016 2 60 (68) 3 12 (68) 83 4 75 (64) 16 18 (61)   3/20/2017 2 52 (76) 2 97 (65) 85 4 31 (58) 16 46 (63)   6/9/17 2 44 (74) 2 85 (63) 86 3 90 (53) 15 42 (64)   10/2/17 2 56 (78) 3 02 (67) 78 4 73 (64) 15 20 (64)      6 Minute Walk:  Distance Saturation FiO2   5/24/2013 1258 ft 100 to 96 RA   8/9/2013 1544 ft 100 to 94 RA   5/15/2015 1574 ft 100 to 89 RA   11/21/2016 1168 ft 100 to 92 RA   3/20/2017 1200 ft 100 to 92 RA   6/9/17 1244 ft 100 to 93 2 L   10/2/17 1218 ft 100 to 97 2 L     Imaging  I personally reviewed the images [facility] system pertinent to today's visitCT chest 1/4/18 -  Overall stable compared to previous CT  Radiology read "acute right eight rib fracture  Multifocal peripheral interstitial thickening, honeycombing, traction bronchiectasis compatible with history of chronic interstitial lung disease   No focal consolidation or mass demonstrated "       Sleep studies:  None                                       DO Iron Chen [address] Sleep Physician

## 2018-05-09 LAB
A ALTERNATA IGE QN: <0.1 KU/L
A FUMIGATUS IGE QN: <0.1 KU/L
BERMUDA GRASS IGE QN: <0.1 KU/L
BOXELDER IGE QN: <0.1 KU/L
C HERBARUM IGE QN: <0.1 KU/L
CAT DANDER IGE QN: <0.1 KU/L
CMN PIGWEED IGE QN: <0.1 KU/L
COMMON RAGWEED IGE QN: <0.1 KU/L
COTTONWOOD IGE QN: <0.1 KU/L
D FARINAE IGE QN: <0.1 KU/L
D PTERONYSS IGE QN: <0.1 KU/L
DOG DANDER IGE QN: <0.1 KU/L
IGE SERPL-ACNC: 18 IU/ML (ref 0–100)
LONDON PLANE IGE QN: <0.1 KU/L
Lab: NORMAL
MOUSE URINE PROT IGE QN: <0.1 KU/L
MT JUNIPER IGE QN: <0.1 KU/L
MUGWORT IGE QN: <0.1 KU/L
PENICILLIUM CHRYSOGENUM: <0.1 KU/L
ROACH IGE QN: <0.1 KU/L
SHEEP SORREL IGE QN: <0.1 KU/L
SILVER BIRCH IGE QN: <0.1 KU/L
TIMOTHY IGE QN: <0.1 KU/L
WALNUT IGE: <0.1 KU/L
WHITE ASH IGE QN: <0.1 KU/L
WHITE ELM IGE QN: <0.1 KU/L
WHITE MULBERRY IGE QN: <0.1 KU/L
WHITE OAK IGE QN: <0.1 KU/L

## 2018-05-14 NOTE — PROGRESS NOTES
PULMONARY REHAB ASSESSMENT    Today's date: 2018   Patient name: Dimple Carranza      : 1945       MRN: [id number]  PCP: Casa Mead DO  Cardiologist: ***  Pulmonologist: Dr Martin Medico  Dx: COPD  Date of onset: ***  Cultural needs: ***    Height:     Weight:   Medical History:   Past Medical History:   Diagnosis Date    Allergic     Anxiety     Hearing loss     Hypertension     Interstitial lung disease ([address] )     Lung disease     diagnosed in      Osteoarthritis involving multiple joints on both sides of body     Pulmonary fibrosis (HCC)        Physical Limitations: ***    Risk Factors   Cholesterol: ***  Smoking: ***  HTN: ***  DM: ***  Obesity: ***   Inactivity: ***  Family History:   Family History   Problem Relation Age of Onset    Cancer Mother     Cholecystitis Father     Emphysema Father     Drug abuse Brother     Stroke Family      Allergies:    Allergies   Allergen Reactions    Sulfa Antibiotics      Other: ***    Current Medications:   Current Outpatient Prescriptions   Medication Sig Dispense Refill    acetaminophen (TYLENOL) 325 mg tablet Take 650 mg by mouth every 6 (six) hours as needed for mild pain      acetaminophen-codeine (TYLENOL #3) 300-30 mg per tablet Take 1 tablet by mouth every 6 (six) hours as needed for moderate pain 30 tablet 0    Albuterol Sulfate 108 (90 Base) MCG/ACT AEPB Inhale 2 puffs every 4 (four) hours      Ascorbic Acid 500 MG CHEW Chew daily      azaTHIOprine (IMURAN) 50 mg tablet Take 50 mg by mouth 3 (three) times a day        cetirizine (ZyrTEC) 10 mg tablet Take 10 mg by mouth daily      cyclobenzaprine (FLEXERIL) 10 mg tablet Take 1 tablet (10 mg total) by mouth daily at bedtime 14 tablet 0    fluticasone (FLONASE) 50 mcg/act nasal spray 1 spray daily  3    furosemide (LASIX) 20 mg tablet Take 1 tablet (20 mg total) by mouth daily 90 tablet 1    losartan (COZAAR) 50 mg tablet Take 25 mg by mouth daily      Multiple Vitamin (MULTIVITAMIN) capsule Take 1 capsule by mouth daily      predniSONE 10 mg tablet 6 tabs on Day 1,5 tabs on Day 2,4 tabs on Day 3,3 tabs on Day 4,2 tabs on  Day 5 And 1 tab on Day 6 21 tablet 0    predniSONE 10 mg tablet 4 for 3 days 3 for 3 days 2 for 3 days and 1 for 3 days 30 tablet 0    promethazine-codeine (PHENERGAN WITH CODEINE) 6 25-10 mg/5 mL syrup Take 5 mL by mouth every 4 (four) hours as needed for cough 180 mL 0    Psyllium ([employer] NATURAL DAILY FIBER) 48 57 % POWD Take by mouth      zinc gluconate 50 mg tablet Take 50 mg by mouth daily       No current facility-administered medications for this visit          Functional Status Prior to Diagnosis for Treatment   Occupation: ***  Recreation: ***  ADLs: ***  [place]: ***  Exercise: ***  Other: ***    Current Functional Status  Occupation: ***  Recreation: ***  ADLs: ***  [place]: ***  Exercise: ***  Other: ***    Short Term Program Goals: ***    Long Term Goals: ***    Ability to reach goals/rehabilitation potential: ***    Projected return to function: ***  Objective tests: ***        Nutritional   Fats/Oils: ***  Sodium: ***  Sweets/ETOH/Caffeine: ***   Dairy/Eggs: ***  Meats:    Beef: ***   Fish: ***  Chicken/Turkey: ***  Pork/Ham: ***  Processed Meats: ***  Fruits: ***  Vegetables: ***  Grains/Beans: ***  Supplements: ***  Social: {AVQFMVU:6577810214}    Clinical Implications: ***    Goals: ***    Emotional/Social  Marital status: {MARITAL STATUS :6610197365}  Rate 1-5:    Marriage: {1-5:3223630030}   Family: {1-5:6012937433}   Financial: {1-5:8279567499}   Relationships: {1-5:4705472261}   Spirituality: {1-5:4551224451}   Intellectual: {1-5:2571730857}    Life Stressors: ***    Goals: ***    Domestic Violence Screening: {1-5:1347516213}    Comments: ***

## 2018-05-15 ENCOUNTER — APPOINTMENT (OUTPATIENT)
Dept: CARDIAC REHAB | Facility: HOSPITAL | Age: 73
End: 2018-05-15
Payer: COMMERCIAL

## 2018-05-22 ENCOUNTER — CLINICAL SUPPORT (OUTPATIENT)
Dept: CARDIAC REHAB | Facility: HOSPITAL | Age: 73
End: 2018-05-22
Payer: COMMERCIAL

## 2018-05-22 ENCOUNTER — APPOINTMENT (OUTPATIENT)
Dept: CARDIAC REHAB | Facility: HOSPITAL | Age: 73
End: 2018-05-22
Payer: COMMERCIAL

## 2018-05-22 VITALS — HEIGHT: 70 IN | BODY MASS INDEX: 35.79 KG/M2 | WEIGHT: 250 LBS

## 2018-05-22 DIAGNOSIS — J84.10 PULMONARY FIBROSIS (HCC): ICD-10-CM

## 2018-05-22 DIAGNOSIS — J84.9 ILD (INTERSTITIAL LUNG DISEASE) (HCC): ICD-10-CM

## 2018-05-22 PROCEDURE — G0424 PULMONARY REHAB W EXER: HCPCS

## 2018-05-22 NOTE — PROGRESS NOTES
CARDIAC/PULMONARY REHAB ASSESSMENT    Today's date: 2018   Patient name: Mk Mccoy      : 1945       MRN: [id number]  PCP: Carmelina Wilkerson DO  Pulmonologist: Dr Dannielle Mathias  Dx: Interstitial lung disease  Date of onset:   Cultural needs: n/a    Height: 5'10"  Weight:   260 lbs  Medical History:   Past Medical History:   Diagnosis Date    Allergic     Anxiety     Hearing loss     Hypertension     [address] [address]     Lung disease     diagnosed in      Osteoarthritis involving multiple joints on both sides of body     Pulmonary fibrosis ([facility] [address]        Physical Limitations: osteoarthritis    Risk Factors   Cholesterol: no  Smoking: former, quit , / ppd  HTN: yes  DM: no  Obesity: yes   Inactivity: no regular exercise, some walking up and down driveway  Family History:   Family History   Problem Relation Age of Onset    Cancer Mother     Cholecystitis Father     Emphysema Father     Drug abuse Brother     Stroke Family      Allergies:    Allergies   Allergen Reactions    Sulfa Antibiotics          Current Medications:   Current Outpatient Prescriptions   Medication Sig Dispense Refill    acetaminophen (TYLENOL) 325 mg tablet Take 650 mg by mouth every 6 (six) hours as needed for mild pain      acetaminophen-codeine (TYLENOL #3) 300-30 mg per tablet Take 1 tablet by mouth every 6 (six) hours as needed for moderate pain 30 tablet 0    Albuterol Sulfate 108 (90 Base) MCG/ACT AEPB Inhale 2 puffs every 4 (four) hours      Ascorbic Acid 500 MG CHEW Chew daily      azaTHIOprine (IMURAN) 50 mg tablet Take 50 mg by mouth 3 (three) times a day        cetirizine (ZyrTEC) 10 mg tablet Take 10 mg by mouth daily      cyclobenzaprine (FLEXERIL) 10 mg tablet Take 1 tablet (10 mg total) by mouth daily at bedtime 14 tablet 0    fluticasone (FLONASE) 50 mcg/act nasal spray 1 spray daily  3    furosemide (LASIX) 20 mg tablet Take 1 tablet (20 mg total) by mouth daily 90 tablet 1    losartan (COZAAR) 50 mg tablet Take 25 mg by mouth daily      Multiple Vitamin (MULTIVITAMIN) capsule Take 1 capsule by mouth daily      predniSONE 10 mg tablet 6 tabs on Day 1,5 tabs on Day 2,4 tabs on Day 3,3 tabs on Day 4,2 tabs on  Day 5 And 1 tab on Day 6 21 tablet 0    predniSONE 10 mg tablet 4 for 3 days 3 for 3 days 2 for 3 days and 1 for 3 days 30 tablet 0    promethazine-codeine (PHENERGAN WITH CODEINE) 6 25-10 mg/5 mL syrup Take 5 mL by mouth every 4 (four) hours as needed for cough 180 mL 0    Psyllium ([employer] NATURAL DAILY FIBER) 48 57 % POWD Take by mouth      zinc gluconate 50 mg tablet Take 50 mg by mouth daily       No current facility-administered medications for this visit  Patient reports no longer taking: Acetaminophen 325 mg                                                       Acetaminophen-codeine 300-30 mg                                                     Cyclobenzaprine 10 mg                                                       Fluticasone 50 mcg                                                  Promethazine-codeine 6 25-10 mg/5 mL syrup  Patient reports taking prednisone 10 mg once daily    Functional Status Prior to Diagnosis for Treatment   Occupation: Retired  Recreation: no hobbies  ADLs: yard work, house work  [place]: independent with [address] [facility], self care, driving  Exercise: no regular exercise    Current Functional Status  Occupation: Retired  Recreation: no hobbies or recreational activiteis  ADLs: limited activities due to shortness of breath, can walk 75 yards without stopping to get to [facility] Life: independent with ADLS, self care, driving  Exercise: no regular exercise-walks driveway to get mail    Short Term Program Goals:   1  Decrease shortness of breath, breathe better  2  Be able to walk around stores when shopping, progress walking distance  3   Learn limitations and how to exercise safely on my own over next month in [place], then continue regular exercise at gym (due to high copay only attending x 1 month)    Long Term Goals: Increase strength and endurance  Slow process of pulmonary fibrosis by incorporating regular aerobic exercise in to lifestyle  Decrease need for supplemental oxygen      Ability to reach goals/rehabilitation potential: Patient has set reasonable and attainable goals    Projected return to function: Patient scheduled to attend PII 2x/week for 4 weeks   Should see some increase in functional capacity within that time frame  Objective tests: 6 min walk pre and post rehab        Nutritional   Fats/Oils: olive oil  Sodium: no restrictions  Sweets/ETOH/Caffeine: sweetsL: occassional, ETOH: no, caffeine: coffee daily 1-2 cups   Dairy/Eggs: Milk: 1:, cheese: regular, eggs: 3-4/week  Meats:    Beef: 1   Fish: 0  Chicken/Turkey: 2-3  Pork/Ham: 1-2/mo  Processed Meats: lunch meat occassionally  Fruits: 1-2x/day   Vegetables: 1-2x/day  Grains/Beans: grains: white bread, pasta, rice  Supplements: no  Social: Cook at home    Clinical Implications: 68% low fat diet,    Goals:  decrease fat intake, increase fiber intake    Emotional/Social  Marital status:   Rate 1-5:    Marriage: 5 Values/Choices 5   Family: 5 Values/Choices 5   Financial: 4 Values/Choices 4   Relationships: 4 Values/Choices 4   Spirituality: 4 Values/Choices 4   Intellectual: 4 Values/Choices 4    Life Stressors:   No stress    Goals: Learn stress management and relaxation techniques, use exercise as a relaxation technique    Domestic Violence Screening: No

## 2018-05-25 NOTE — PROGRESS NOTES
Pulmonary Rehabilitation Plan of Care   Care Plan       Today's date: 2018   Visits: 1-intake  Patient name: Bakari Jernigan      : 1945  Age: 68 y o  MRN: [id number]  Referring Physician: Sandhya Rubio,*  Provider: [facility]  Clinician: Kelsy Still MS    Dx: Interstitial lung disease, pulmonary fibrosis  Date of onset:     Comments: patient is ready to start his PII program and hopes he can learn limitations with exercise and how to monitor himself so he can join an gym and feel safe exercising on his own  He only plans to attend PII for 1 month due to high copay  Medication compliance: Yes   Comments: pt reports changes from last office visit notes  Fall Risk: No   Comments: no falls to date    EXERCISE/ACTIVITY    Cardiovascular:   Min: 15-20   METS: 1 5-2 5   Hr: 117-137   RPE: 11-13, 3 dyspnea   O2 sat: maintain >90% with use of supplemental oxygen titrated as necessary    Modalities: Treadmill, UBE, Lifecycle, NuStep and Recumbent bike  Strength trainin-3 days / week, 12-15 repitations  and 1-2 sets per modality    Modalities: Lateral raise and Arm curl, arm extension, pulldown, leg extension  EKG changes: n/a  Dyspnea score: 69  Home activity: Limited activity due to shortness of breath  No regular exercise at this time  Goals: Start PII program 2x/week for 4 weeks to learn limitations and self monitoring skills  Education: Exercise principles, home exercise plan, THR, RPE/dyspnea scale, breathing techniques  Plan: Exercise in PII 2x/week to learn limitations with exercise and feel confident to join gym to continue regular exercise   Educate on self monitoring  Readiness to change: preparation    NUTRITION    Weight control:    Starting weight: 260 lbs   Current weight: Weight - Scale: 113 kg (250 lb)   Waist circumference:    Startin"   Current: Waist circumference (inches): 48 Inches  Diabetes: N/A  Lipid management: manage lipids to stay within guidelines  Goals: Start heart healthy diet, increase fiber, decrease fat intake  Education: DASH, 20% fat diet, label reading  Plan: Educate patient on heart healthy eating, incorporate regular exercise into lifestyle  Readiness to change: preparation    PSYCHOSOCIAL    Emotional:    Self-reported stress level: 2   Social support: Reports adequate support from wife  Wife came to interview with patient     Goals: n/a  Education: stress management  Plan: Educate patient on stress management and relaxation  Readiness to change: action    OTHER CORE COMPONENTS     Tobacco:   History   Smoking Status    Former Smoker    Packs/day: 1 00    Years: 0 00    Types: Cigarettes    Quit date: 2009   Smokeless Tobacco    Never Used     Blood pressure:    Restin/86   Exercise:  Recovery: 132/80  Goals: Continue to manage BP within recommended guidelines  Education: DASH siet  Plan: Incorporate regular exercise and diet into healthy lifestyle  Readiness to change: preparation

## 2018-05-25 NOTE — PROGRESS NOTES
CARDIAC/PULMONARY REHAB ASSESSMENT    Today's date: 2018   Patient name: Elo Disla      : 1945       MRN: [id number]  PCP: Carina Hankins DO  Cardiologist: ***  Surgeon: ***  Dx: No diagnosis found  Date of onset: ***  Cultural needs: ***    Height:     Weight:   Medical History:   Past Medical History:   Diagnosis Date    Allergic     Anxiety     Hearing loss     Hypertension     Interstitial lung disease ([address] )     Lung disease     diagnosed in      Osteoarthritis involving multiple joints on both sides of body     Pulmonary fibrosis (HCC)        Physical Limitations: ***    Risk Factors   Cholesterol: ***  Smoking: ***  HTN: ***  DM: ***  Obesity: ***   Inactivity: ***  Family History:   Family History   Problem Relation Age of Onset    Cancer Mother     Cholecystitis Father     Emphysema Father     Drug abuse Brother     Stroke Family      Allergies:    Allergies   Allergen Reactions    Sulfa Antibiotics      Other: ***    Current Medications:   Current Outpatient Prescriptions   Medication Sig Dispense Refill    acetaminophen (TYLENOL) 325 mg tablet Take 650 mg by mouth every 6 (six) hours as needed for mild pain      acetaminophen-codeine (TYLENOL #3) 300-30 mg per tablet Take 1 tablet by mouth every 6 (six) hours as needed for moderate pain 30 tablet 0    Albuterol Sulfate 108 (90 Base) MCG/ACT AEPB Inhale 2 puffs every 4 (four) hours      Ascorbic Acid 500 MG CHEW Chew daily      azaTHIOprine (IMURAN) 50 mg tablet Take 50 mg by mouth 3 (three) times a day        cetirizine (ZyrTEC) 10 mg tablet Take 10 mg by mouth daily      cyclobenzaprine (FLEXERIL) 10 mg tablet Take 1 tablet (10 mg total) by mouth daily at bedtime 14 tablet 0    fluticasone (FLONASE) 50 mcg/act nasal spray 1 spray daily  3    furosemide (LASIX) 20 mg tablet Take 1 tablet (20 mg total) by mouth daily 90 tablet 1    losartan (COZAAR) 50 mg tablet Take 25 mg by mouth daily      Multiple Vitamin (MULTIVITAMIN) capsule Take 1 capsule by mouth daily      predniSONE 10 mg tablet 6 tabs on Day 1,5 tabs on Day 2,4 tabs on Day 3,3 tabs on Day 4,2 tabs on  Day 5 And 1 tab on Day 6 21 tablet 0    predniSONE 10 mg tablet 4 for 3 days 3 for 3 days 2 for 3 days and 1 for 3 days 30 tablet 0    promethazine-codeine (PHENERGAN WITH CODEINE) 6 25-10 mg/5 mL syrup Take 5 mL by mouth every 4 (four) hours as needed for cough 180 mL 0    Psyllium ([employer] NATURAL DAILY FIBER) 48 57 % POWD Take by mouth      zinc gluconate 50 mg tablet Take 50 mg by mouth daily       No current facility-administered medications for this visit          Functional Status Prior to Diagnosis for Treatment   Occupation: ***  Recreation: ***  ADLs: ***  [place]: ***  Exercise: ***  Other: ***    Current Functional Status  Occupation: ***  Recreation: ***  ADLs: ***  [place]: ***  Exercise: ***  Other: ***    Short Term Program Goals: ***    Long Term Goals: ***    Ability to reach goals/rehabilitation potential: ***    Projected return to function: ***  Objective tests: ***        Nutritional   Fats/Oils: ***  Sodium: ***  Sweets/ETOH/Caffeine: ***   Dairy/Eggs: ***  Meats:    Beef: ***   Fish: ***  Chicken/Turkey: ***  Pork/Ham: ***  Processed Meats: ***  Fruits: ***  Vegetables: ***  Grains/Beans: ***  Supplements: ***  Social: {BQVBYFW:6627929919}    Clinical Implications: ***    Goals: ***    Emotional/Social  Marital status: {MARITAL STATUS :7469024626}  Rate 1-5:    Marriage: {1-5:5310224021}   Family: {1-5:0790691576}   Financial: {1-5:4071766461}   Relationships: {1-5:1793063073}   Spirituality: {1-5:7540053646}   Intellectual: {1-5:7293297741}    Life Stressors: ***    Goals: ***    Domestic Violence Screening: {1-5:2618924171}    Comments: ***

## 2018-05-29 ENCOUNTER — OFFICE VISIT (OUTPATIENT)
Dept: PULMONOLOGY | Facility: HOSPITAL | Age: 73
End: 2018-05-29

## 2018-05-29 ENCOUNTER — CLINICAL SUPPORT (OUTPATIENT)
Dept: PULMONOLOGY | Facility: HOSPITAL | Age: 73
End: 2018-05-29
Payer: COMMERCIAL

## 2018-05-29 DIAGNOSIS — J44.9 CHRONIC OBSTRUCTIVE PULMONARY DISEASE, UNSPECIFIED COPD TYPE (HCC): ICD-10-CM

## 2018-05-29 DIAGNOSIS — J84.9 ILD (INTERSTITIAL LUNG DISEASE) (HCC): Primary | ICD-10-CM

## 2018-05-29 PROCEDURE — G0424 PULMONARY REHAB W EXER: HCPCS

## 2018-05-29 PROCEDURE — RECHECK: Performed by: INTERNAL MEDICINE

## 2018-05-29 NOTE — PROGRESS NOTES
Medical Director Initial day Pulmonary Rehabilitation Review    I certify that I have met with Susan Chamorro face-to face to provide a 30 day progress review of his/her pulmonary rehabilitation program at [address] [facility]    I have reviewed the most recent individualized treatment plan (ITP), outcomes assessment, and provided opportunity for discussion with the patient    Comments: No concerns    Continue with current treatment plan yes    Please provide the following modifications to the current treatment plan: N/A      Catherine Dickerson,

## 2018-05-29 NOTE — PROGRESS NOTES
Exercise Modality  Initial Workload MET Level    Nu-Step (NU) Level: 3 Steps/Minute: 60 1 5 METs   Rower (RW) Level: na       Airdyne Bike (AD-Bike) Level: 2 0 1 5  METs   Arm Ergometer (AE) RPM: 30 Allison: 20  METs   Treadmill 1 8 mph 0% grade 2 4  METs   Recumbent Bike (RB) Level: 1 Allison: 40 3 4 METs   Resistance (RES) 5-8 lbs Weights 30 lbs Pulleys Level Red Resistance Bands          Comments:   Patient completed 6 min walk test with a max MET rate of 1 9 METs using supplemental oxygen at 3L/min via nasal cannula  We will begin exercise around 1 5-2 5 METs and will increase exercise intensity and duration as tolerated by the patient  Supplemental oxygen will be given as needed to maintain SaO2 > 90%

## 2018-05-29 NOTE — PROGRESS NOTES
Outcomes:    Name: Colt Carranza : 1945      Risk:      LOW/ MOD/ HIGH        Pre Post % change  Goal   Date: 2018      Physical           Sub Max ETT (mets)   #DIV/0! 10% increase   6MWT (feet) 1300  -100 0% 10% increase   UCSD Dyspnea Score 57 5  -100 0% 5 pt decrease   Supplemental O2 use (L) 3L      [employer] AI (est  peak O2) n/a  #VALUE!     Peak exercise CR/ DC (mets) 1 9  -100 0% 40% increase   Questionnaires           PHQ 9  (> 10 refer to MD) 0  #DIV/0! 4 pt decrease   [facility] lower score = improvement     Total  23  -100 0% < 27   Feelings 1  -100 0% < 3   Physical fitness 4  -100 0% < 3   Social Support 2  0 0% < 3   Daily activities 3  -100 0% < 3   Social Activities 2  -100 0% < 3   Pain 2  -100 0% < 3   Overall Health 4  -100 0% < 3   Quality of Life 3  -100 0% < 3   Change in health 3  -100 0% < 3   Rate your plate 51  -191 5% > 58   Measurements           Weight 250  -100 0% 2 5-5%    BMI 35 9  -100 0% 19-25   Waist Circ  48  -100 0% < 40 M / < 35 F   % Body fat 30 3  -100 0% < 25 M / < 33 F    BP left arm     (systolic) 890  -182 7% < 013                              (diastolic) 86  -574 5% < 90   Smoking #/day (if applicable) 0  #DIV/0! 0   Total cholesterol not available  #VALUE!    Triglycerides   #DIV/0! < 150   HDL   #DIV/0! 40-60   LDL   #DIV/0! < 100   A1C % not available  #VALUE! 4 0 - 5 6%   Fasting BG not available  #VALUE!

## 2018-05-31 ENCOUNTER — CLINICAL SUPPORT (OUTPATIENT)
Dept: PULMONOLOGY | Facility: HOSPITAL | Age: 73
End: 2018-05-31
Payer: COMMERCIAL

## 2018-05-31 DIAGNOSIS — J44.9 CHRONIC OBSTRUCTIVE PULMONARY DISEASE, UNSPECIFIED COPD TYPE (HCC): ICD-10-CM

## 2018-05-31 PROCEDURE — G0424 PULMONARY REHAB W EXER: HCPCS

## 2018-06-05 ENCOUNTER — CLINICAL SUPPORT (OUTPATIENT)
Dept: PULMONOLOGY | Facility: HOSPITAL | Age: 73
End: 2018-06-05
Payer: COMMERCIAL

## 2018-06-05 DIAGNOSIS — J44.9 CHRONIC OBSTRUCTIVE PULMONARY DISEASE, UNSPECIFIED COPD TYPE (HCC): ICD-10-CM

## 2018-06-05 PROCEDURE — G0424 PULMONARY REHAB W EXER: HCPCS

## 2018-06-07 ENCOUNTER — APPOINTMENT (OUTPATIENT)
Dept: PULMONOLOGY | Facility: HOSPITAL | Age: 73
End: 2018-06-07
Payer: COMMERCIAL

## 2018-06-12 ENCOUNTER — APPOINTMENT (OUTPATIENT)
Dept: PULMONOLOGY | Facility: HOSPITAL | Age: 73
End: 2018-06-12
Payer: COMMERCIAL

## 2018-06-12 NOTE — PROGRESS NOTES
Pulmonary Rehabilitation Plan of Care   30 Day/Final Report         Today's date: 2018   Visits: 3  Patient name: Arthur Hook                                               : 1945  Age: 68 y o  MRN: [id number]  Referring Physician: Kristy Kent,*  Provider: [facility]  Clinician: Sarbjit Sharma MS     Dx: Interstitial lung disease, pulmonary fibrosis  Date of onset:      Comments: Ed completed 3 sessions of his PII program and has withdrawn due to high copay  He reports he now knows what he needs to do at the gym on his own and plans to continue exercising at [facility] 2x/week  Patient was educated on home exercise plan and self monitoring with pulseox and dyspnea scale  Unable to educate on dietary changes during 3 visits  Gave pt handouts on dietary guidelines and encouraged low fat diet      Medication compliance: Yes              Comments: pt reports changes from last office visit notes  Fall Risk: No              Comments: no falls to date     EXERCISE/ACTIVITY     Cardiovascular:              Min: 15-20              METS: 1 5-2 5              Hr: 117-137              RPE: 11-13, 3 dyspnea              O2 sat: maintain >90% with use of supplemental oxygen titrated as necessary                    Modalities: Treadmill, UBE, Lifecycle, NuStep and Recumbent bike  Strength trainin-3 days / week, 12-15 repitations  and 1-2 sets per modality               Modalities: Lateral raise and Arm curl, arm extension, pulldown, leg extension  EKG changes: n/a  Dyspnea score: 69  Home activity: Limited activity due to shortness of breath   No regular exercise at this time  Goals: Start PII program 2x/week for 4 weeks to learn limitations and self monitoring skills  Education: Exercise principles, home exercise plan, THR, RPE/dyspnea scale, breathing techniques  Plan: Exercise in PII 2x/week to learn limitations with exercise and feel confident to join gym to continue regular exercise  Educate on self monitoring  Readiness to change: preparation     NUTRITION     Weight control:               Starting weight: 260 lbs              Current weight: Weight - Scale: 113 kg (250 lb)   Waist circumference:               Startin"              Current: Waist circumference (inches): 48 Inches  Diabetes: N/A  Lipid management: manage lipids to stay within guidelines  Goals: Start heart healthy diet, increase fiber, decrease fat intake  Education: DASH, 20% fat diet, label reading  Plan: Educate patient on heart healthy eating, incorporate regular exercise into lifestyle  Readiness to change: preparation     PSYCHOSOCIAL     Emotional:    Self-reported stress level: 2   Social support: Reports adequate support from wife  Wife came to interview with patient     Goals: n/a  Education: stress management  Plan: Educate patient on stress management and relaxation  Readiness to change: action     OTHER CORE COMPONENTS      Tobacco:        History   Smoking Status    Former Smoker    Packs/day: 1 00    Years: 0 00    Types: Cigarettes    Quit date: 2009   Smokeless Tobacco    Never Used      Blood pressure:               Restin/86              Exercise:  Recovery: 132/80  Goals: Continue to manage BP within recommended guidelines  Education: DASH siet  Plan: Incorporate regular exercise and diet into healthy lifestyle  Readiness to change: preparation

## 2018-06-14 ENCOUNTER — APPOINTMENT (OUTPATIENT)
Dept: PULMONOLOGY | Facility: HOSPITAL | Age: 73
End: 2018-06-14
Payer: COMMERCIAL

## 2018-06-19 ENCOUNTER — APPOINTMENT (OUTPATIENT)
Dept: PULMONOLOGY | Facility: HOSPITAL | Age: 73
End: 2018-06-19
Payer: COMMERCIAL

## 2018-06-21 ENCOUNTER — APPOINTMENT (OUTPATIENT)
Dept: PULMONOLOGY | Facility: HOSPITAL | Age: 73
End: 2018-06-21
Payer: COMMERCIAL

## 2018-06-26 ENCOUNTER — APPOINTMENT (OUTPATIENT)
Dept: PULMONOLOGY | Facility: HOSPITAL | Age: 73
End: 2018-06-26
Payer: COMMERCIAL

## 2018-06-28 ENCOUNTER — APPOINTMENT (OUTPATIENT)
Dept: PULMONOLOGY | Facility: HOSPITAL | Age: 73
End: 2018-06-28
Payer: COMMERCIAL

## 2018-07-17 ENCOUNTER — OFFICE VISIT (OUTPATIENT)
Dept: PULMONOLOGY | Facility: HOSPITAL | Age: 73
End: 2018-07-17
Payer: COMMERCIAL

## 2018-07-17 VITALS
SYSTOLIC BLOOD PRESSURE: 112 MMHG | BODY MASS INDEX: 36.94 KG/M2 | HEIGHT: 70 IN | OXYGEN SATURATION: 91 % | TEMPERATURE: 97.4 F | DIASTOLIC BLOOD PRESSURE: 66 MMHG | HEART RATE: 74 BPM | WEIGHT: 258 LBS

## 2018-07-17 DIAGNOSIS — R05.3 CHRONIC COUGH: ICD-10-CM

## 2018-07-17 DIAGNOSIS — R06.83 SNORING: ICD-10-CM

## 2018-07-17 DIAGNOSIS — J30.9 ALLERGIC RHINITIS, UNSPECIFIED SEASONALITY, UNSPECIFIED TRIGGER: ICD-10-CM

## 2018-07-17 DIAGNOSIS — J67.9 HYPERSENSITIVITY PNEUMONITIS (HCC): ICD-10-CM

## 2018-07-17 DIAGNOSIS — J84.9 ILD (INTERSTITIAL LUNG DISEASE) (HCC): Primary | ICD-10-CM

## 2018-07-17 DIAGNOSIS — J96.11 CHRONIC RESPIRATORY FAILURE WITH HYPOXIA (HCC): ICD-10-CM

## 2018-07-17 PROCEDURE — 99215 OFFICE O/P EST HI 40 MIN: CPT | Performed by: INTERNAL MEDICINE

## 2018-07-17 RX ORDER — GABAPENTIN 300 MG/1
300 CAPSULE ORAL
Qty: 30 CAPSULE | Refills: 3 | Status: SHIPPED | OUTPATIENT
Start: 2018-07-17 | End: 2018-09-21 | Stop reason: ALTCHOICE

## 2018-07-17 RX ORDER — BUDESONIDE AND FORMOTEROL FUMARATE DIHYDRATE 160; 4.5 UG/1; UG/1
2 AEROSOL RESPIRATORY (INHALATION)
COMMUNITY
End: 2018-09-21 | Stop reason: ALTCHOICE

## 2018-07-17 NOTE — LETTER
July 18, 2018     Redd Nunez,   [address]  [address]  [facility] [address]    Patient: [facility]   YOB: 1945   Date of Visit: 7/17/2018       Dear Dr Inder Syed: Thank you for referring [facility] to me for evaluation  Below are my notes for this consultation  If you have questions, please do not hesitate to call me  I look forward to following your patient along with you  Sincerely,        Celena Montaño DO        CC: No Recipients  Celena Montaño DO  7/18/2018  2:24 PM  Sign at close encounter  Progress note - Pulmonary Medicine   [facility] 68 y o  male MRN: [id number]       Impression & Plan:   67 y/o M with PMHx of HTN, ILD who comes in to start with outpatient pulmonary follow up in the [facility]  1   Chronic hypoxic respiratory failure secondary to chronic progressive hypersensitivity pneumonitis with bronchiectasis - biopsy proven  Currently on chronic Imuran and Prednisone therapy  Previously on Cellcept with no benefit  3 recent viral illness causing some progression of disease  Moderate restriction on previously PFTs  6 minute walk at [facility] demonstrated that the patient was stable on 2L and PFTs remain unchanged with TLC in 60 range  -  Continue 2L of oxygen support through the day  -    Continue pulmonary rehab therapy to help with his oxygen requirement      -    Northeast panel negative  -  Continue Imuran at 150mg Daily      -  Continue Prednisone 10mg Daily      -    No benefit from either stiolto or Symbicort  He held on inhalers      -  Tessalon perrles did not help his cough  Will trial neurontin to see if that is helpful instead  Follow BMP to ensure renal function is stable  -  Hold on repeat testing at this point as per patient preference        2  Allergic rhinitis -  Continue Flonase and zyrtec  I explained he could also consider use of singulair    If he is still having trouble, will consider that at next visit  3   Snoring/witnessed apneas -  Mallampati class 4, Body mass index is 36 78 kg/m² ,  He is at risk for obstructive sleep apnea based on [employer] survey  -    He has no interest in pursuing treatment at this time  -  I  again explained how this effects his underlying lung disease and the other risks of leaving sleep apnea untreated including hypertension, heart failure, arrhythmia, MI and stroke     ______________________________________________________________________    HPI:    Lisa Casillas presents today for follow-up for chronic hypoxic respiratory failure  His biggest problem is chronic cough which is worse at night and shortness of breath with exertion  He is inconsistent about wearing his oxygen when he is exerting himself  He will then develop significant dyspnea after ambulation  He checks his oxygen level after he stops and it will drop sometimes into mid 80s  He denies lightheadedness, dizzineses or weakness  He did try previous therapies including teasslon perrles, symbicort and stiolto  He did not find that any of them helped  He did admit to post nasal drip and has found some benefit in use of flonase  I also encouraged that he use zyrtec over the counter as well  Review of Systems:  Review of Systems   Constitutional: Negative for activity change, diaphoresis and unexpected weight change  HENT: Positive for congestion, postnasal drip and sinus pain  Negative for drooling, sore throat and trouble swallowing  Eyes: Negative  Respiratory: Positive for cough, chest tightness, shortness of breath and wheezing  Cardiovascular: Negative for chest pain and palpitations  Gastrointestinal: Negative for abdominal distention, constipation and nausea  Genitourinary: Negative for dysuria, frequency and urgency  Musculoskeletal: Negative for arthralgias and joint swelling  Skin: Negative for color change and rash     Neurological: Negative for dizziness  Psychiatric/Behavioral: Negative for behavioral problems and decreased concentration  The patient is nervous/anxious  Social history updates:  History   Smoking Status    Former Smoker    Packs/day: 0 50    Years: 50 00    Types: Cigarettes    Quit date: 2/12/2009   Smokeless Tobacco    Never Used     Comment: smoking on an off      Social History     Social History    Marital status: Unknown     Spouse name: N/A    Number of children: N/A    Years of education: N/A     Occupational History    Not on file       Social History Main Topics    Smoking status: Former Smoker     Packs/day: 0 50     Years: 50 00     Types: Cigarettes     Quit date: 2/12/2009    Smokeless tobacco: Never Used      Comment: smoking on an off     Alcohol use Yes      Comment: social    Drug use: No    Sexual activity: Yes     Other Topics Concern    Not on file     Social History Narrative    No narrative on file       PhysicalExamination:  Vitals:   /66 (BP Location: Left arm, Patient Position: Sitting)   Pulse 74   Temp (!) 97 4 °F (36 3 °C) (Tympanic)   Ht 5' 10" (1 778 m)   Wt 117 kg (258 lb)   SpO2 91%   BMI 37 02 kg/m²    General: Pleasant, obese, Awake alert and oriented x 3, conversant without conversational dyspnea, NAD, normal affect  HEENT:  PERRL, Sclera noninjected, nonicteric OU, Nares patent,  no craniofacial abnormalities, Mucous membranes, moist, no oral lesions, normal dentition, Mallampati class 4  NECK: Trachea midline, no accessory muscle use, no stridor, no cervical or supraclavicular adenopathy, JVP not elevated  CARDIAC: Reg, single s1/S2, no m/r/g  PULM: crackles in lung bases bilaterally, no wheezing, rhonchi or rales  ABD: Normoactive bowel sounds, soft nontender, nondistended, no rebound, no rigidity, no guarding  EXT: No cyanosis, no clubbing, no edema, normal capillary refill  NEURO: no focal neurologic deficits, AAOx3, moving all extremities appropriately      Diagnostic Data:  Labs: I personally reviewed the most recent laboratory data pertinent to today's visit  I have personally reviewed pertinent lab results  The most recent pulmonary function tests were reviewed  From [place]  PFTS:  Date    FEV1           FVC      FEV1/FVC TLC       DLCO   5/2013 2 87 (83%) 3 56 (76%) 81% 5 56 (76%) 19 14 (77%)   8/9/2013 3 08 (95) 3 73 (95) 83    17 59 (52)   5/15/2015 2 92 (88) 3 60 (80) 81 5 07 (71) (46)   5/10/2016 2 86 (86) 3 29 (72) 87 4 63 (67) 14 76 (49)   11/21/2016 2 60 (68) 3 12 (68) 83 4 75 (64) 16 18 (61)   3/20/2017 2 52 (76) 2 97 (65) 85 4 31 (58) 16 46 (63)   6/9/17 2 44 (74) 2 85 (63) 86 3 90 (53) 15 42 (64)   10/2/17 2 56 (78) 3 02 (67) 78 4 73 (64) 15 20 (64)      6 Minute Walk:  Distance Saturation FiO2   5/24/2013 1258 ft 100 to 96 RA   8/9/2013 1544 ft 100 to 94 RA   5/15/2015 1574 ft 100 to 89 RA   11/21/2016 1168 ft 100 to 92 RA   3/20/2017 1200 ft 100 to 92 RA   6/9/17 1244 ft 100 to 93 2 L   10/2/17 1218 ft 100 to 97 2 L     Negative for anti Isatu, Anti scl 70, ACE, ANCA, sjogrens,    Positive for pigeon droppings    [facility] walk  Six minute walk in office 5/2/18  6 minute walk performed   Desaturation on 2L nasal cannula   Oyxgen needed to be increased to 4L NC to maintain saturation of 92%   Ambulated 168 meters      Imaging  I personally reviewed the images on the [facility] system pertinent to today's visitCT chest 1/4/18 -  Overall stable compared to previous CT  Radiology read "acute right eight rib fracture  Multifocal peripheral interstitial thickening, honeycombing, traction bronchiectasis compatible with history of chronic interstitial lung disease  No focal consolidation or mass demonstrated  "      Sleep studies:  None                                          [place] Mec, DO

## 2018-07-18 DIAGNOSIS — I10 ESSENTIAL HYPERTENSION: Primary | ICD-10-CM

## 2018-07-18 PROBLEM — J96.11 CHRONIC RESPIRATORY FAILURE WITH HYPOXIA (HCC): Status: ACTIVE | Noted: 2018-07-18

## 2018-07-18 PROBLEM — R06.83 SNORING: Status: ACTIVE | Noted: 2018-07-18

## 2018-07-18 PROBLEM — J30.9 ALLERGIC RHINITIS: Status: ACTIVE | Noted: 2018-07-18

## 2018-07-18 RX ORDER — LOSARTAN POTASSIUM 50 MG/1
50 TABLET ORAL DAILY
Qty: 90 TABLET | Refills: 1 | Status: SHIPPED | OUTPATIENT
Start: 2018-07-18 | End: 2019-01-09 | Stop reason: SDUPTHER

## 2018-07-18 NOTE — PROGRESS NOTES
Progress note - Pulmonary Medicine   Natasha Garcia 68 y o  male MRN: [id number]       Impression & Plan:   67 y/o M with PMHx of HTN, ILD who comes in to start with outpatient pulmonary follow up in the [facility]  1   Chronic hypoxic respiratory failure secondary to chronic progressive hypersensitivity pneumonitis with bronchiectasis - biopsy proven  Currently on chronic Imuran and Prednisone therapy  Previously on Cellcept with no benefit  3 recent viral illness causing some progression of disease  Moderate restriction on previously PFTs  6 minute walk at [facility] demonstrated that the patient was stable on 2L and PFTs remain unchanged with TLC in 60 range  -  Continue 2L of oxygen support through the day  -  Continue pulmonary rehab therapy to help with his oxygen requirement      -  Northeast panel negative  -  Continue Imuran at 150mg Daily      -  Continue Prednisone 10mg Daily      -  No benefit from either stiolto or Symbicort  He held on inhalers      -  Tessalon perrles did not help his cough  Will trial neurontin to see if that is helpful instead  Follow BMP to ensure renal function is stable  -  Hold on repeat testing at this point as per patient preference        2  Allergic rhinitis -  Continue Flonase and zyrtec  I explained he could also consider use of singulair  If he is still having trouble, will consider that at next visit  3   Snoring/witnessed apneas -  Mallampati class 4, Body mass index is 36 78 kg/m² ,  He is at risk for obstructive sleep apnea based on [employer] survey  -  He has no interest in pursuing treatment at this time          -  I again explained how this effects his underlying lung disease and the other risks of leaving sleep apnea untreated including hypertension, heart failure, arrhythmia, MI and stroke     ______________________________________________________________________    HPI:    Natasha Garcia presents today for follow-up for chronic hypoxic respiratory failure  His biggest problem is chronic cough which is worse at night and shortness of breath with exertion  He is inconsistent about wearing his oxygen when he is exerting himself  He will then develop significant dyspnea after ambulation  He checks his oxygen level after he stops and it will drop sometimes into mid 80s  He denies lightheadedness, dizzineses or weakness  He did try previous therapies including teasslon perrles, symbicort and stiolto  He did not find that any of them helped  He did admit to post nasal drip and has found some benefit in use of flonase  I also encouraged that he use zyrtec over the counter as well  Review of Systems:  Review of Systems   Constitutional: Negative for activity change, diaphoresis and unexpected weight change  HENT: Positive for congestion, postnasal drip and sinus pain  Negative for drooling, sore throat and trouble swallowing  Eyes: Negative  Respiratory: Positive for cough, chest tightness, shortness of breath and wheezing  Cardiovascular: Negative for chest pain and palpitations  Gastrointestinal: Negative for abdominal distention, constipation and nausea  Genitourinary: Negative for dysuria, frequency and urgency  Musculoskeletal: Negative for arthralgias and joint swelling  Skin: Negative for color change and rash  Neurological: Negative for dizziness  Psychiatric/Behavioral: Negative for behavioral problems and decreased concentration  The patient is nervous/anxious  Social history updates:  History   Smoking Status    Former Smoker    Packs/day: 0 50    Years: 50 00    Types: Cigarettes    Quit date: 2/12/2009   Smokeless Tobacco    Never Used     Comment: smoking on an off      Social History     Social History    Marital status: Unknown     Spouse name: N/A    Number of children: N/A    Years of education: N/A     Occupational History    Not on file       Social History Main Topics    Smoking status: Former Smoker     Packs/day: 0 50     Years: 50 00     Types: Cigarettes     Quit date: 2/12/2009    Smokeless tobacco: Never Used      Comment: smoking on an off     Alcohol use Yes      Comment: social    Drug use: No    Sexual activity: Yes     Other Topics Concern    Not on file     Social History Narrative    No narrative on file       PhysicalExamination:  Vitals:   /66 (BP Location: Left arm, Patient Position: Sitting)   Pulse 74   Temp (!) 97 4 °F (36 3 °C) (Tympanic)   Ht 5' 10" (1 778 m)   Wt 117 kg (258 lb)   SpO2 91%   BMI 37 02 kg/m²   General: Pleasant, obese, Awake alert and oriented x 3, conversant without conversational dyspnea, NAD, normal affect  HEENT:  PERRL, Sclera noninjected, nonicteric OU, Nares patent,  no craniofacial abnormalities, Mucous membranes, moist, no oral lesions, normal dentition, Mallampati class 4  NECK: Trachea midline, no accessory muscle use, no stridor, no cervical or supraclavicular adenopathy, JVP not elevated  CARDIAC: Reg, single s1/S2, no m/r/g  PULM: crackles in lung bases bilaterally, no wheezing, rhonchi or rales  ABD: Normoactive bowel sounds, soft nontender, nondistended, no rebound, no rigidity, no guarding  EXT: No cyanosis, no clubbing, no edema, normal capillary refill  NEURO: no focal neurologic deficits, AAOx3, moving all extremities appropriately      Diagnostic Data:  Labs: I personally reviewed the most recent laboratory data pertinent to today's visit  I have personally reviewed pertinent lab results  The most recent pulmonary function tests were reviewed    From [place]  PFTS:  Date    FEV1           FVC      FEV1/FVC TLC       DLCO   5/2013 2 87 (83%) 3 56 (76%) 81% 5 56 (76%) 19 14 (77%)   8/9/2013 3 08 (95) 3 73 (95) 83    17 59 (52)   5/15/2015 2 92 (88) 3 60 (80) 81 5 07 (71) (46)   5/10/2016 2 86 (86) 3 29 (72) 87 4 63 (67) 14 76 (49)   11/21/2016 2 60 (68) 3 12 (68) 83 4 75 (64) 16 18 (61)   3/20/2017 2 52 (76) 2 97 (65) 85 4 31 (58) 16 46 (63)   6/9/17 2 44 (74) 2 85 (63) 86 3 90 (53) 15 42 (64)   10/2/17 2 56 (78) 3 02 (67) 78 4 73 (64) 15 20 (64)      6 Minute Walk:  Distance Saturation FiO2   5/24/2013 1258 ft 100 to 96 RA   8/9/2013 1544 ft 100 to 94 RA   5/15/2015 1574 ft 100 to 89 RA   11/21/2016 1168 ft 100 to 92 RA   3/20/2017 1200 ft 100 to 92 RA   6/9/17 1244 ft 100 to 93 2 L   10/2/17 1218 ft 100 to 97 2 L     Negative for anti Isatu, Anti scl 70, ACE, ANCA, sjogrens,    Positive for pigeon droppings    [facility] walk  Six minute walk in office 5/2/18  6 minute walk performed   Desaturation on 2L nasal cannula   Oyxgen needed to be increased to 4L NC to maintain saturation of 92%   Ambulated 168 meters      Imaging  I personally reviewed the images on the [facility] system pertinent to today's visitCT chest 1/4/18 -  Overall stable compared to previous CT  Radiology read "acute right eight rib fracture  Multifocal peripheral interstitial thickening, honeycombing, traction bronchiectasis compatible with history of chronic interstitial lung disease  No focal consolidation or mass demonstrated  "      Sleep studies:  None                                          Vedia Pounds, DO

## 2018-07-26 LAB
AMBIG ABBREV DEFAULT: NORMAL
BUN SERPL-MCNC: 12 MG/DL (ref 8–27)
BUN/CREAT SERPL: 15 (ref 10–24)
CALCIUM SERPL-MCNC: 9.5 MG/DL (ref 8.6–10.2)
CHLORIDE SERPL-SCNC: 98 MMOL/L (ref 96–106)
CO2 SERPL-SCNC: 22 MMOL/L (ref 20–29)
CREAT SERPL-MCNC: 0.82 MG/DL (ref 0.76–1.27)
GLUCOSE SERPL-MCNC: 113 MG/DL (ref 65–99)
POTASSIUM SERPL-SCNC: 4.2 MMOL/L (ref 3.5–5.2)
SL AMB EGFR AFRICAN AMERICAN: 101 ML/MIN/1.73
SL AMB EGFR NON AFRICAN AMERICAN: 88 ML/MIN/1.73
SODIUM SERPL-SCNC: 138 MMOL/L (ref 134–144)

## 2018-09-15 DIAGNOSIS — R60.9 EDEMA, UNSPECIFIED TYPE: ICD-10-CM

## 2018-09-16 RX ORDER — FUROSEMIDE 20 MG/1
20 TABLET ORAL DAILY
Qty: 90 TABLET | Refills: 1 | OUTPATIENT
Start: 2018-09-16

## 2018-09-21 ENCOUNTER — OFFICE VISIT (OUTPATIENT)
Dept: FAMILY MEDICINE CLINIC | Facility: CLINIC | Age: 73
End: 2018-09-21
Payer: COMMERCIAL

## 2018-09-21 VITALS
DIASTOLIC BLOOD PRESSURE: 78 MMHG | TEMPERATURE: 98.6 F | BODY MASS INDEX: 36.3 KG/M2 | SYSTOLIC BLOOD PRESSURE: 120 MMHG | HEART RATE: 80 BPM | WEIGHT: 253 LBS

## 2018-09-21 DIAGNOSIS — Z23 NEED FOR INFLUENZA VACCINATION: ICD-10-CM

## 2018-09-21 DIAGNOSIS — R60.9 EDEMA, UNSPECIFIED TYPE: ICD-10-CM

## 2018-09-21 DIAGNOSIS — J84.9 INTERSTITIAL LUNG DISEASE (HCC): ICD-10-CM

## 2018-09-21 DIAGNOSIS — R05.9 COUGH: Primary | ICD-10-CM

## 2018-09-21 PROCEDURE — 90662 IIV NO PRSV INCREASED AG IM: CPT

## 2018-09-21 PROCEDURE — G0008 ADMIN INFLUENZA VIRUS VAC: HCPCS

## 2018-09-21 PROCEDURE — 99214 OFFICE O/P EST MOD 30 MIN: CPT | Performed by: FAMILY MEDICINE

## 2018-09-21 PROCEDURE — 4040F PNEUMOC VAC/ADMIN/RCVD: CPT

## 2018-09-21 RX ORDER — PROMETHAZINE HYDROCHLORIDE AND CODEINE PHOSPHATE 6.25; 1 MG/5ML; MG/5ML
5 SYRUP ORAL EVERY 4 HOURS PRN
Qty: 473 ML | Refills: 1 | Status: SHIPPED | OUTPATIENT
Start: 2018-09-21 | End: 2019-03-25 | Stop reason: ALTCHOICE

## 2018-09-21 RX ORDER — FUROSEMIDE 20 MG/1
20 TABLET ORAL DAILY
Qty: 90 TABLET | Refills: 1 | Status: SHIPPED | OUTPATIENT
Start: 2018-09-21 | End: 2019-03-15 | Stop reason: SDUPTHER

## 2018-09-21 NOTE — PROGRESS NOTES
Assessment/Plan:     Diagnoses and all orders for this visit:    Cough  -     promethazine-codeine (PHENERGAN WITH CODEINE) 6 25-10 mg/5 mL syrup; Take 5 mL by mouth every 4 (four) hours as needed for cough    Interstitial lung disease ([address] )  -     CT chest wo contrast; Future    Edema, unspecified type  -     furosemide (LASIX) 20 mg tablet; Take 1 tablet (20 mg total) by mouth daily    Need for influenza vaccination  -     influenza vaccine, 1140-0142, high-dose, PF 0 5 mL, for patients 65 yr+ (FLUZONE HIGH-DOSE)        Treatment as above  Follow-up in 6 months or sooner if needed    Subjective:     Chief Complaint   Patient presents with    Follow-up     Check up 6 months Hypertension        Patient ID: Dimple Carranza is a 68 y o  male  Patient here for six-month check  Of chronic conditions  States cough has gotten slightly worse recently  Cough medicine does help with this  His upcoming appointment with Pulmonary like to have a CT scan prior to have the pulmonologist be able to review        The following portions of the patient's history were reviewed and updated as appropriate: allergies, current medications, past family history, past medical history, past social history, past surgical history and problem list     Review of Systems   Constitutional: Negative  HENT: Negative  Eyes: Negative  Respiratory: Positive for cough and shortness of breath  Cardiovascular: Negative  Gastrointestinal: Negative  Endocrine: Negative  Genitourinary: Negative  Musculoskeletal: Negative  Skin: Negative  Allergic/Immunologic: Negative  Neurological: Negative  Hematological: Negative  Psychiatric/Behavioral: Negative  All other systems reviewed and are negative          Objective:    Vitals:    09/21/18 1036   BP: 120/78   BP Location: Right arm   Patient Position: Sitting   Cuff Size: Standard   Pulse: 80   Temp: 98 6 °F (37 °C)   TempSrc: Tympanic   Weight: 115 kg (253 lb) Physical Exam   Constitutional: He is oriented to person, place, and time  He appears well-developed and well-nourished  No distress  HENT:   Head: Normocephalic  Right Ear: External ear normal    Left Ear: External ear normal    Nose: Nose normal    Mouth/Throat: Oropharynx is clear and moist    Eyes: Conjunctivae and EOM are normal  Pupils are equal, round, and reactive to light  Right eye exhibits no discharge  Left eye exhibits no discharge  Neck: Normal range of motion  Cardiovascular: Normal rate, regular rhythm and normal heart sounds  Pulmonary/Chest: Effort normal and breath sounds normal    Decreased breath sounds bilaterally   Abdominal: Soft  Bowel sounds are normal  He exhibits no distension  There is no tenderness  Musculoskeletal: Normal range of motion  Neurological: He is alert and oriented to person, place, and time  No cranial nerve deficit  Skin: Skin is warm and dry  No rash noted  Psychiatric: He has a normal mood and affect  His behavior is normal  Judgment and thought content normal    Nursing note and vitals reviewed

## 2018-09-24 ENCOUNTER — TRANSCRIBE ORDERS (OUTPATIENT)
Dept: ADMINISTRATIVE | Facility: HOSPITAL | Age: 73
End: 2018-09-24

## 2018-09-24 DIAGNOSIS — J84.9 ILD (INTERSTITIAL LUNG DISEASE) (HCC): Primary | ICD-10-CM

## 2018-09-26 ENCOUNTER — HOSPITAL ENCOUNTER (OUTPATIENT)
Dept: PULMONOLOGY | Facility: HOSPITAL | Age: 73
Discharge: HOME | End: 2018-09-26
Payer: COMMERCIAL

## 2018-09-26 DIAGNOSIS — J84.9 ILD (INTERSTITIAL LUNG DISEASE) (HCC): ICD-10-CM

## 2018-09-26 PROCEDURE — 94729 DIFFUSING CAPACITY: CPT

## 2018-09-26 PROCEDURE — 94060 EVALUATION OF WHEEZING: CPT | Performed by: INTERNAL MEDICINE

## 2018-09-26 PROCEDURE — 94726 PLETHYSMOGRAPHY LUNG VOLUMES: CPT | Performed by: INTERNAL MEDICINE

## 2018-09-26 PROCEDURE — 94760 N-INVAS EAR/PLS OXIMETRY 1: CPT

## 2018-09-26 PROCEDURE — 94729 DIFFUSING CAPACITY: CPT | Performed by: INTERNAL MEDICINE

## 2018-09-26 PROCEDURE — 94060 EVALUATION OF WHEEZING: CPT

## 2018-09-26 PROCEDURE — 94726 PLETHYSMOGRAPHY LUNG VOLUMES: CPT

## 2018-09-26 RX ORDER — ALBUTEROL SULFATE 2.5 MG/3ML
2.5 SOLUTION RESPIRATORY (INHALATION) EVERY 6 HOURS PRN
Status: DISCONTINUED | OUTPATIENT
Start: 2018-09-26 | End: 2018-09-30 | Stop reason: HOSPADM

## 2018-09-26 RX ADMIN — ALBUTEROL SULFATE 2.5 MG: 2.5 SOLUTION RESPIRATORY (INHALATION) at 08:00

## 2018-09-27 ENCOUNTER — HOSPITAL ENCOUNTER (OUTPATIENT)
Dept: CT IMAGING | Facility: HOSPITAL | Age: 73
Discharge: HOME | End: 2018-09-27
Payer: COMMERCIAL

## 2018-09-27 DIAGNOSIS — J84.9 INTERSTITIAL LUNG DISEASE (HCC): ICD-10-CM

## 2018-09-27 PROCEDURE — 71250 CT THORAX DX C-: CPT

## 2018-10-22 ENCOUNTER — TRANSCRIBE ORDERS (OUTPATIENT)
Dept: ADMINISTRATIVE | Facility: HOSPITAL | Age: 73
End: 2018-10-22

## 2018-10-22 DIAGNOSIS — J98.4 LUNG DISEASE: Primary | ICD-10-CM

## 2018-10-26 ENCOUNTER — HOSPITAL ENCOUNTER (OUTPATIENT)
Dept: NON INVASIVE DIAGNOSTICS | Facility: HOSPITAL | Age: 73
Discharge: HOME | End: 2018-10-26
Payer: COMMERCIAL

## 2018-10-26 DIAGNOSIS — J98.4 LUNG DISEASE: ICD-10-CM

## 2018-10-26 PROCEDURE — 93306 TTE W/DOPPLER COMPLETE: CPT | Performed by: INTERNAL MEDICINE

## 2018-10-26 PROCEDURE — 93306 TTE W/DOPPLER COMPLETE: CPT

## 2018-12-12 ENCOUNTER — TRANSCRIBE ORDERS (OUTPATIENT)
Dept: ADMINISTRATIVE | Facility: HOSPITAL | Age: 73
End: 2018-12-12

## 2018-12-12 DIAGNOSIS — J84.9 ILD (INTERSTITIAL LUNG DISEASE) (HCC): Primary | ICD-10-CM

## 2018-12-20 ENCOUNTER — HOSPITAL ENCOUNTER (OUTPATIENT)
Dept: PULMONOLOGY | Facility: HOSPITAL | Age: 73
Discharge: HOME | End: 2018-12-20
Payer: COMMERCIAL

## 2018-12-20 DIAGNOSIS — J84.9 ILD (INTERSTITIAL LUNG DISEASE) (HCC): ICD-10-CM

## 2018-12-20 PROCEDURE — 94618 PULMONARY STRESS TESTING: CPT | Performed by: INTERNAL MEDICINE

## 2018-12-20 PROCEDURE — 94726 PLETHYSMOGRAPHY LUNG VOLUMES: CPT | Performed by: INTERNAL MEDICINE

## 2018-12-20 PROCEDURE — 94761 N-INVAS EAR/PLS OXIMETRY MLT: CPT

## 2018-12-20 PROCEDURE — 94729 DIFFUSING CAPACITY: CPT | Performed by: INTERNAL MEDICINE

## 2018-12-20 PROCEDURE — 94729 DIFFUSING CAPACITY: CPT

## 2018-12-20 PROCEDURE — 94726 PLETHYSMOGRAPHY LUNG VOLUMES: CPT

## 2018-12-20 PROCEDURE — 94060 EVALUATION OF WHEEZING: CPT | Performed by: INTERNAL MEDICINE

## 2018-12-20 PROCEDURE — 94760 N-INVAS EAR/PLS OXIMETRY 1: CPT

## 2018-12-20 PROCEDURE — 94060 EVALUATION OF WHEEZING: CPT

## 2018-12-20 RX ORDER — ALBUTEROL SULFATE 2.5 MG/3ML
2.5 SOLUTION RESPIRATORY (INHALATION) EVERY 6 HOURS PRN
Status: DISCONTINUED | OUTPATIENT
Start: 2018-12-20 | End: 2018-12-24 | Stop reason: HOSPADM

## 2018-12-20 RX ADMIN — ALBUTEROL SULFATE 2.5 MG: 2.5 SOLUTION RESPIRATORY (INHALATION) at 10:52

## 2018-12-27 ENCOUNTER — TELEPHONE (OUTPATIENT)
Dept: PULMONOLOGY | Facility: CLINIC | Age: 73
End: 2018-12-27

## 2018-12-27 NOTE — TELEPHONE ENCOUNTER
Hello, Patient called looking for results of his PFT and 6 min walk test that he had done on 12/20/2018  Patient's wife Quan Villa would like to be called with results at [phone]

## 2018-12-27 NOTE — TELEPHONE ENCOUNTER
Patient's spouse Nils Mukherjee called requesting results on patient's Spirometry and 6 minute walk done 12/20/18 to be fax to [phone] chantelle Ramirez  Patient has an appointment at [facility] 01/03/19 and would like results fax before  Nils Mukherjee can be call [phone] w/ update   Thank you

## 2018-12-28 NOTE — PROGRESS NOTES
Spoke to him of PFT results and increased oxygen requirement on 6 minute walked  Suggested a short trial of 40mg of Lasix every other day for 1 week to see if it helped  He had recent increase in imuran and prednisone  We will follow to see if there is any benefit with that at next visit

## 2019-01-09 DIAGNOSIS — I10 ESSENTIAL HYPERTENSION: ICD-10-CM

## 2019-01-09 RX ORDER — LOSARTAN POTASSIUM 50 MG/1
TABLET ORAL
Qty: 90 TABLET | Refills: 1 | Status: SHIPPED | OUTPATIENT
Start: 2019-01-09 | End: 2019-07-09 | Stop reason: SDUPTHER

## 2019-03-15 ENCOUNTER — TELEPHONE (OUTPATIENT)
Dept: FAMILY MEDICINE CLINIC | Facility: CLINIC | Age: 74
End: 2019-03-15

## 2019-03-15 DIAGNOSIS — R60.9 EDEMA, UNSPECIFIED TYPE: ICD-10-CM

## 2019-03-15 RX ORDER — FUROSEMIDE 20 MG/1
20 TABLET ORAL DAILY
Qty: 90 TABLET | Refills: 1 | Status: SHIPPED | OUTPATIENT
Start: 2019-03-15 | End: 2019-04-23 | Stop reason: SDUPTHER

## 2019-03-15 RX ORDER — FUROSEMIDE 20 MG/1
TABLET ORAL
Qty: 90 TABLET | Refills: 1 | OUTPATIENT
Start: 2019-03-15

## 2019-03-15 NOTE — TELEPHONE ENCOUNTER
Patient would like refill of furosemide 20 mg tab take 1 tab daily 90 day supply sent to [facility] in [place]

## 2019-03-25 ENCOUNTER — OFFICE VISIT (OUTPATIENT)
Dept: FAMILY MEDICINE CLINIC | Facility: CLINIC | Age: 74
End: 2019-03-25
Payer: COMMERCIAL

## 2019-03-25 VITALS
HEIGHT: 70 IN | WEIGHT: 259 LBS | DIASTOLIC BLOOD PRESSURE: 78 MMHG | HEART RATE: 105 BPM | SYSTOLIC BLOOD PRESSURE: 134 MMHG | TEMPERATURE: 98.7 F | BODY MASS INDEX: 37.08 KG/M2 | OXYGEN SATURATION: 98 %

## 2019-03-25 DIAGNOSIS — J96.11 CHRONIC RESPIRATORY FAILURE WITH HYPOXIA (HCC): ICD-10-CM

## 2019-03-25 DIAGNOSIS — I10 ESSENTIAL HYPERTENSION: ICD-10-CM

## 2019-03-25 DIAGNOSIS — Z00.00 MEDICARE ANNUAL WELLNESS VISIT, INITIAL: Primary | ICD-10-CM

## 2019-03-25 DIAGNOSIS — J84.9 ILD (INTERSTITIAL LUNG DISEASE) (HCC): ICD-10-CM

## 2019-03-25 PROCEDURE — 1036F TOBACCO NON-USER: CPT | Performed by: FAMILY MEDICINE

## 2019-03-25 PROCEDURE — 1170F FXNL STATUS ASSESSED: CPT | Performed by: FAMILY MEDICINE

## 2019-03-25 PROCEDURE — G0438 PPPS, INITIAL VISIT: HCPCS | Performed by: FAMILY MEDICINE

## 2019-03-25 PROCEDURE — 99214 OFFICE O/P EST MOD 30 MIN: CPT | Performed by: FAMILY MEDICINE

## 2019-03-25 PROCEDURE — 3075F SYST BP GE 130 - 139MM HG: CPT | Performed by: FAMILY MEDICINE

## 2019-03-25 PROCEDURE — 1160F RVW MEDS BY RX/DR IN RCRD: CPT | Performed by: FAMILY MEDICINE

## 2019-03-25 PROCEDURE — 1125F AMNT PAIN NOTED PAIN PRSNT: CPT | Performed by: FAMILY MEDICINE

## 2019-03-25 PROCEDURE — 3078F DIAST BP <80 MM HG: CPT | Performed by: FAMILY MEDICINE

## 2019-03-25 PROCEDURE — 3008F BODY MASS INDEX DOCD: CPT | Performed by: FAMILY MEDICINE

## 2019-03-25 NOTE — PROGRESS NOTES
Assessment/Plan:     Diagnoses and all orders for this visit:    Medicare annual wellness visit, initial    Medicare wellness visit completed  See other note for acute and chronic issues            Subjective:     CC: Medicare Wellness Visit       Patient ID: Cary Darden is a 76 y o  male  Patient is here for Medicare wellness visit  See other note for acute chronic issues      The following portions of the patient's history were reviewed and updated as appropriate: allergies, current medications, past family history, past medical history, past social history, past surgical history and problem list     Review of Systems   Constitutional: Negative  HENT: Negative  Eyes: Negative  Respiratory: Positive for shortness of breath  Cardiovascular: Negative  Gastrointestinal: Negative  Negative for bowel incontinence  Endocrine: Negative  Genitourinary: Negative  Musculoskeletal: Negative  Skin: Negative  Allergic/Immunologic: Negative  Neurological: Negative  Hematological: Negative  Psychiatric/Behavioral: Negative  The patient is not nervous/anxious  All other systems reviewed and are negative  Objective:    Vitals:    03/25/19 1342   BP: 134/78   BP Location: Right arm   Patient Position: Sitting   Cuff Size: Standard   Pulse: 105   Temp: 98 7 °F (37 1 °C)   TempSrc: Tympanic   SpO2: 98%   Weight: 117 kg (259 lb)   Height: 5' 10" (1 778 m)          Physical Exam   Constitutional: He is oriented to person, place, and time  He appears well-developed and well-nourished  No distress  HENT:   Head: Normocephalic  Right Ear: External ear normal    Left Ear: External ear normal    Nose: Nose normal    Mouth/Throat: Oropharynx is clear and moist    Eyes: Pupils are equal, round, and reactive to light  Conjunctivae and EOM are normal  Right eye exhibits no discharge  Left eye exhibits no discharge  Neck: Normal range of motion     Cardiovascular: Normal rate, regular rhythm and normal heart sounds  Pulmonary/Chest: Effort normal and breath sounds normal    Decreased breath sounds bilaterally  On chronic oxygen   Abdominal: Soft  Bowel sounds are normal  He exhibits no distension  There is no tenderness  Musculoskeletal: Normal range of motion  Neurological: He is alert and oriented to person, place, and time  No cranial nerve deficit  Skin: Skin is warm and dry  No rash noted  Psychiatric: He has a normal mood and affect  His behavior is normal  Judgment and thought content normal    Nursing note and vitals reviewed  Assessment and Plan:    Problem List Items Addressed This Visit        Respiratory    Chronic respiratory failure with hypoxia ([address] )    ILD (interstitial lung disease) ([address] )    Relevant Orders    CT chest wo contrast       Cardiovascular and Mediastinum    High blood pressure      Other Visit Diagnoses     Medicare annual wellness visit, initial    -  Primary        Health Maintenance Due   Topic Date Due    Hepatitis C Screening  1945    Medicare Annual Wellness Visit (AWV)  1945    BMI: Followup Plan  01/26/1963    DTaP,Tdap,and Td Vaccines (1 - Tdap) 01/26/1966    PT PLAN OF CARE  04/26/2018         HPI:  Deidra Weinstein is a 76 y o  male here for his Initial Wellness Visit      Patient Active Problem List   Diagnosis    Aftercare for long-term (current) use of steroids    Hypersensitivity pneumonitis ([address] )    High blood pressure    ILD (interstitial lung disease) ([address] )    Osteoarthritis    Viral URI with cough    Chronic respiratory failure with hypoxia (HCC)    Snoring    Allergic rhinitis     Past Medical History:   Diagnosis Date    Allergic     Anxiety     Hearing loss     Hypertension     Interstitial lung disease ([address] )     Lung disease     diagnosed in 2013     Osteoarthritis involving multiple joints on both sides of body     Pulmonary fibrosis ([address] )      Past Surgical History:   Procedure Laterality Date    BRONCHOSCOPY      CARPAL TUNNEL RELEASE      CATARACT EXTRACTION      LUNG BIOPSY      DE COLONOSCOPY FLX DX W/COLLJ SPEC WHEN PFRMD N/A 2/21/2018    Procedure: COLONOSCOPY;  Surgeon: Elva Smith MD;  [facility];  Service: Gastroenterology    ROTATOR CUFF REPAIR      SHOULDER SURGERY Left     TONSILLECTOMY      ULNAR COLLATERAL LIGAMENT RECONSTRUCTION      ulnar artery removed     Family History   Problem Relation Age of Onset    Cancer Mother     Cholecystitis Father     Emphysema Father     Drug abuse Brother     Stroke Family      Social History     Tobacco Use   Smoking Status Former Smoker    Packs/day: 0 50    Years: 50 00    Pack years: 25 00    Types: Cigarettes    Last attempt to quit: 2/12/2009    Years since quitting: 10 1   Smokeless Tobacco Never Used   Tobacco Comment    smoking on an off      Social History     Substance and Sexual Activity   Alcohol Use Not Currently    Frequency: Never    Comment: social      Social History     Substance and Sexual Activity   Drug Use No       Current Outpatient Medications   Medication Sig Dispense Refill    acetaminophen (TYLENOL) 325 mg tablet Take 650 mg by mouth every 6 (six) hours as needed for mild pain      Ascorbic Acid 500 MG CHEW Chew daily      azaTHIOprine (IMURAN) 50 mg tablet Take 50 mg by mouth 3 (three) times a day        cetirizine (ZyrTEC) 10 mg tablet Take 10 mg by mouth daily      fluticasone (FLONASE) 50 mcg/act nasal spray 1 spray daily  3    furosemide (LASIX) 20 mg tablet Take 1 tablet (20 mg total) by mouth daily 90 tablet 1    losartan (COZAAR) 50 mg tablet TAKE 1 TABLET BY MOUTH EVERY DAY 90 tablet 1    Multiple Vitamin (MULTIVITAMIN) capsule Take 1 capsule by mouth daily      predniSONE 10 mg tablet 6 tabs on Day 1,5 tabs on Day 2,4 tabs on Day 3,3 tabs on Day 4,2 tabs on  Day 5 And 1 tab on Day 6 21 tablet 0    Psyllium ([employer] NATURAL DAILY FIBER) 48 57 % POWD Take by mouth      zinc gluconate 50 mg tablet Take 50 mg by mouth daily       No current facility-administered medications for this visit  Allergies   Allergen Reactions    Sulfa Antibiotics Hives     Immunization History   Administered Date(s) Administered    INFLUENZA 10/26/2017, 10/27/2017    Influenza TIV (IM) 11/07/2016, 10/18/2017, 10/27/2017    Influenza, high dose seasonal 0 5 mL 09/21/2018    Pneumococcal Conjugate 13-Valent 07/20/2016, 11/07/2016    Pneumococcal Polysaccharide PPV23 08/17/2017       Patient Care Team:  Josh Arredondo DO as PCP - General (Family Medicine)    Medicare Screening Tests and Risk Assessments:  Yue Chavez is here for his Initial Wellness visit  Last Medicare Wellness visit information reviewed, patient interviewed and updates made to the record today  Health Risk Assessment:  Patient rates overall health as poor  Patient feels that their physical health rating is Slightly worse  Eyesight was rated as Slightly worse  Hearing was rated as Slightly worse  Patient feels that their emotional and mental health rating is Same  Pain experienced by patient in the last 7 days has been Some  Patient's pain rating has been 3/10  Patient states that he has experienced no weight loss or gain in last 6 months  Emotional/Mental Health:  Patient has been feeling nervous/anxious  PHQ-9 Depression Screening:    Frequency of the following problems over the past two weeks:      1  Little interest or pleasure in doing things: 0 - not at all      2  Feeling down, depressed, or hopeless: 0 - not at all  PHQ-2 Score: 0          Broken Bones/Falls: Fall Risk Assessment:    In the past year, patient has experienced: No history of falling in past year          Bladder/Bowel:  Patient has not leaked urine accidently in the last six months  Patient reports no loss of bowel control  Immunizations:  Patient has had a flu vaccination within the last year  Patient has received a pneumonia shot    Patient has not received a shingles shot  Patient has received tetanus/diphtheria shot  Home Safety:  Patient has trouble with stairs inside or outside of their home  Patient currently reports that there are no safety hazards present in home, working smoke alarms, no working carbon monoxide detectors  Preventative Screenings:   prostate cancer screen performed, colon cancer screen completed, cholesterol screen completed, glaucoma eye exam completed,     Nutrition:  Current diet: Regular with servings of the following:    Medications:  Patient is currently taking over-the-counter supplements  Patient is able to manage medications  Lifestyle Choices:  Patient reports no tobacco use  Patient has smoked or used tobacco in the past   Patient has stopped his tobacco use  Patient reports no alcohol use  Patient drives a vehicle  Patient wears seat belt  Activities of Daily Living:  Can get out of bed by his or her self, able to dress self, able to make own meals, able to do own shopping, able to bathe self, can do own laundry/housekeeping, can manage own money, pay bills and track expenses    Previous Hospitalizations:  No hospitalization or ED visit in past 12 months        Advanced Directives:  Patient has decided on a power of   Patient has spoken to designated power of   Patient has completed advanced directive          Preventative Screening/Counseling:      Cardiovascular:      General: Screening Current          Diabetes:      General: Screening Current          Colorectal Cancer:      General: Screening Current          Prostate Cancer:      General: Screening Current          Osteoporosis:      General: Screening Not Indicated          AAA:      General: Screening Not Indicated          Glaucoma:      General: Screening Current          HIV:      General: Screening Not Indicated          Hepatitis C:      General: Screening Not Indicated        Advanced Directives:   Patient has living will for healthcare, has durable POA for healthcare, patient has an advanced directive  Information on ACP and/or AD provided  No 5 wishes given  End of life assessment reviewed with patient  Provider agrees with end of life decisions   Immunizations:      Influenza: Influenza UTD This Year      Pneumococcal: Lifetime Vaccine Completed      Shingrix: Patient Declines      Hepatitis B (Low risk patients): Series Not Indicated      Zostavax: Patient Declines      TD: Vaccine Status Unknown      TDAP: Vaccine Status Unknown      Other Preventative Counseling (Non-Medicare):   Fall Prevention, Increase physical activity, Nutrition Counseling and Car/seat belt/driving safety reviewed

## 2019-03-25 NOTE — PROGRESS NOTES
Assessment/Plan:     Diagnoses and all orders for this visit:  l    ILD (interstitial lung disease) ([facility] )  -     CT chest wo contrast; Future    Chronic respiratory failure with hypoxia (HCC)  High Blood pressure        will order CT scan has patient has severe interstitial lung disease and has noticed an acute worsening of his symptoms over the past few weeks  Will increase his prednisone to 20 milligrams  Patient will need to see Pulmonary as much sooner than his current [place] scheduled appointment in May  I offered my help in trying to get a sooner appointment if needed  His blood pressure is under control today on the losartan and the Lasix will continue to monitor      Subjective:     Chief Complaint   Patient presents with    Well Check     6 month         Patient ID: Vandana Godfrey is a 76 y o  male  Patient is here for worsening shortness of breath  Patient has severe interstitial lung disease and is chronically on oxygen at 3 liters  He states that over the past few month his symptoms have gotten significantly worse      The following portions of the patient's history were reviewed and updated as appropriate: allergies, current medications, past family history, past medical history, past social history, past surgical history and problem list     Review of Systems   Constitutional: Negative  HENT: Negative  Eyes: Negative  Respiratory: Positive for shortness of breath  Cardiovascular: Negative  Gastrointestinal: Negative  Endocrine: Negative  Genitourinary: Negative  Musculoskeletal: Negative  Skin: Negative  Allergic/Immunologic: Negative  Neurological: Negative  Hematological: Negative  Psychiatric/Behavioral: Negative  All other systems reviewed and are negative          Objective:    Vitals:    03/25/19 1342   BP: 134/78   BP Location: Right arm   Patient Position: Sitting   Cuff Size: Standard   Pulse: 105   Temp: 98 7 °F (37 1 °C)   TempSrc: Tympanic SpO2: 98%   Weight: 117 kg (259 lb)   Height: 5' 10" (1 778 m)          Physical Exam   Constitutional: He is oriented to person, place, and time  He appears well-developed and well-nourished  No distress  HENT:   Head: Normocephalic  Right Ear: External ear normal    Left Ear: External ear normal    Nose: Nose normal    Mouth/Throat: Oropharynx is clear and moist    Eyes: Pupils are equal, round, and reactive to light  Conjunctivae and EOM are normal  Right eye exhibits no discharge  Left eye exhibits no discharge  Neck: Normal range of motion  Cardiovascular: Normal rate, regular rhythm and normal heart sounds  Pulmonary/Chest: He is in respiratory distress  On 3 liters of oxygen chronically  Decreased breath sounds bilaterally   Abdominal: Soft  Bowel sounds are normal  He exhibits no distension  There is no tenderness  Musculoskeletal: Normal range of motion  Neurological: He is alert and oriented to person, place, and time  No cranial nerve deficit  Skin: Skin is warm and dry  No rash noted  Psychiatric: He has a normal mood and affect  His behavior is normal  Judgment and thought content normal    Nursing note and vitals reviewed

## 2019-03-27 ENCOUNTER — TRANSCRIBE ORDERS (OUTPATIENT)
Dept: ADMINISTRATIVE | Facility: HOSPITAL | Age: 74
End: 2019-03-27

## 2019-03-27 DIAGNOSIS — J84.09 SIMPLE PULMONARY ALVEOLITIS (HCC): Primary | ICD-10-CM

## 2019-03-28 ENCOUNTER — HOSPITAL ENCOUNTER (OUTPATIENT)
Dept: CT IMAGING | Facility: HOSPITAL | Age: 74
Discharge: HOME | End: 2019-03-28
Payer: COMMERCIAL

## 2019-03-28 DIAGNOSIS — J84.9 ILD (INTERSTITIAL LUNG DISEASE) (HCC): ICD-10-CM

## 2019-03-28 PROCEDURE — 71250 CT THORAX DX C-: CPT

## 2019-04-23 ENCOUNTER — TELEPHONE (OUTPATIENT)
Dept: FAMILY MEDICINE CLINIC | Facility: CLINIC | Age: 74
End: 2019-04-23

## 2019-04-23 DIAGNOSIS — R60.9 EDEMA, UNSPECIFIED TYPE: ICD-10-CM

## 2019-04-23 RX ORDER — FUROSEMIDE 40 MG/1
40 TABLET ORAL DAILY
Qty: 90 TABLET | Refills: 1 | Status: SHIPPED | OUTPATIENT
Start: 2019-04-23

## 2019-05-10 ENCOUNTER — TELEPHONE (OUTPATIENT)
Dept: FAMILY MEDICINE CLINIC | Facility: CLINIC | Age: 74
End: 2019-05-10

## 2019-05-10 DIAGNOSIS — R74.8 ELEVATED LIVER ENZYMES: Primary | ICD-10-CM

## 2019-05-14 LAB
ALBUMIN SERPL-MCNC: 3.4 G/DL (ref 3.5–4.8)
ALBUMIN/GLOB SERPL: 1.5 {RATIO} (ref 1.2–2.2)
ALP SERPL-CCNC: 70 IU/L (ref 39–117)
ALT SERPL-CCNC: 165 IU/L (ref 0–44)
AMYLASE SERPL-CCNC: 73 U/L (ref 31–124)
APPEARANCE UR: CLEAR
AST SERPL-CCNC: 166 IU/L (ref 0–40)
B BURGDOR IGG+IGM SER-ACNC: <0.91 ISR (ref 0–0.9)
BACTERIA URNS QL MICRO: NORMAL
BASOPHILS # BLD AUTO: 0 X10E3/UL (ref 0–0.2)
BASOPHILS NFR BLD AUTO: 1 %
BILIRUB SERPL-MCNC: 1 MG/DL (ref 0–1.2)
BILIRUB UR QL STRIP: NEGATIVE
BUN SERPL-MCNC: 11 MG/DL (ref 8–27)
BUN/CREAT SERPL: 14 (ref 10–24)
CALCIUM SERPL-MCNC: 8.5 MG/DL (ref 8.6–10.2)
CHLORIDE SERPL-SCNC: 96 MMOL/L (ref 96–106)
CO2 SERPL-SCNC: 24 MMOL/L (ref 20–29)
COLOR UR: YELLOW
CREAT SERPL-MCNC: 0.77 MG/DL (ref 0.76–1.27)
EOSINOPHIL # BLD AUTO: 0.2 X10E3/UL (ref 0–0.4)
EOSINOPHIL NFR BLD AUTO: 3 %
EPI CELLS #/AREA URNS HPF: NORMAL /HPF (ref 0–10)
ERYTHROCYTE [DISTWIDTH] IN BLOOD BY AUTOMATED COUNT: 14.9 % (ref 12.3–15.4)
ERYTHROCYTE [SEDIMENTATION RATE] IN BLOOD BY WESTERGREN METHOD: 5 MM/HR (ref 0–30)
GGT SERPL-CCNC: 250 IU/L (ref 0–65)
GLOBULIN SER-MCNC: 2.2 G/DL (ref 1.5–4.5)
GLUCOSE SERPL-MCNC: 144 MG/DL (ref 65–99)
GLUCOSE UR QL: NEGATIVE
HAV IGM SERPL QL IA: NEGATIVE
HBV CORE IGM SERPL QL IA: NEGATIVE
HBV SURFACE AG SERPL QL IA: NEGATIVE
HCT VFR BLD AUTO: 42.9 % (ref 37.5–51)
HCV AB S/CO SERPL IA: >11 S/CO RATIO (ref 0–0.9)
HGB BLD-MCNC: 14.7 G/DL (ref 13–17.7)
HGB UR QL STRIP: NEGATIVE
IMM GRANULOCYTES # BLD: 0 X10E3/UL (ref 0–0.1)
IMM GRANULOCYTES NFR BLD: 0 %
KETONES UR QL STRIP: NEGATIVE
LABCORP COMMENT: NORMAL
LEUKOCYTE ESTERASE UR QL STRIP: NEGATIVE
LIPASE SERPL-CCNC: 58 U/L (ref 13–78)
LYMPHOCYTES # BLD AUTO: 1.1 X10E3/UL (ref 0.7–3.1)
LYMPHOCYTES NFR BLD AUTO: 13 %
MCH RBC QN AUTO: 33.2 PG (ref 26.6–33)
MCHC RBC AUTO-ENTMCNC: 34.3 G/DL (ref 31.5–35.7)
MCV RBC AUTO: 97 FL (ref 79–97)
MICRO URNS: NORMAL
MICRO URNS: NORMAL
MONOCYTES # BLD AUTO: 0.4 X10E3/UL (ref 0.1–0.9)
MONOCYTES NFR BLD AUTO: 4 %
MUCOUS THREADS URNS QL MICRO: PRESENT
NEUTROPHILS # BLD AUTO: 6.7 X10E3/UL (ref 1.4–7)
NEUTROPHILS NFR BLD AUTO: 79 %
NITRITE UR QL STRIP: NEGATIVE
PH UR STRIP: 7 [PH] (ref 5–7.5)
PLATELET # BLD AUTO: 211 X10E3/UL (ref 150–379)
POTASSIUM SERPL-SCNC: 3.9 MMOL/L (ref 3.5–5.2)
PROT SERPL-MCNC: 5.6 G/DL (ref 6–8.5)
PROT UR QL STRIP: NEGATIVE
RBC # BLD AUTO: 4.43 X10E6/UL (ref 4.14–5.8)
RBC #/AREA URNS HPF: NORMAL /HPF (ref 0–2)
SL AMB EGFR AFRICAN AMERICAN: 103 ML/MIN/1.73
SL AMB EGFR NON AFRICAN AMERICAN: 89 ML/MIN/1.73
SODIUM SERPL-SCNC: 137 MMOL/L (ref 134–144)
SP GR UR: 1.01 (ref 1–1.03)
UROBILINOGEN UR STRIP-ACNC: 1 EU/DL (ref 0.2–1)
WBC # BLD AUTO: 8.4 X10E3/UL (ref 3.4–10.8)
WBC #/AREA URNS HPF: NORMAL /HPF (ref 0–5)

## 2019-05-15 ENCOUNTER — TELEPHONE (OUTPATIENT)
Dept: FAMILY MEDICINE CLINIC | Facility: CLINIC | Age: 74
End: 2019-05-15

## 2019-05-15 DIAGNOSIS — B19.20 HEPATITIS C VIRUS INFECTION WITHOUT HEPATIC COMA, UNSPECIFIED CHRONICITY: Primary | ICD-10-CM

## 2019-05-21 ENCOUNTER — OFFICE VISIT (OUTPATIENT)
Dept: GASTROENTEROLOGY | Facility: CLINIC | Age: 74
End: 2019-05-21
Payer: COMMERCIAL

## 2019-05-21 VITALS
HEART RATE: 78 BPM | HEIGHT: 70 IN | SYSTOLIC BLOOD PRESSURE: 138 MMHG | WEIGHT: 247 LBS | BODY MASS INDEX: 35.36 KG/M2 | DIASTOLIC BLOOD PRESSURE: 80 MMHG

## 2019-05-21 DIAGNOSIS — Z12.11 SCREENING FOR COLON CANCER: ICD-10-CM

## 2019-05-21 DIAGNOSIS — R79.89 ELEVATED LFTS: ICD-10-CM

## 2019-05-21 DIAGNOSIS — B18.2 HEP C W/O COMA, CHRONIC (HCC): Primary | ICD-10-CM

## 2019-05-21 PROCEDURE — 99214 OFFICE O/P EST MOD 30 MIN: CPT | Performed by: NURSE PRACTITIONER

## 2019-05-21 RX ORDER — MYCOPHENOLATE MOFETIL 500 MG/1
2000 TABLET ORAL EVERY 12 HOURS SCHEDULED
COMMUNITY

## 2019-05-24 ENCOUNTER — TELEPHONE (OUTPATIENT)
Dept: GASTROENTEROLOGY | Facility: CLINIC | Age: 74
End: 2019-05-24

## 2019-05-24 DIAGNOSIS — B18.2 HEP C W/O COMA, CHRONIC (HCC): Primary | ICD-10-CM

## 2019-05-24 DIAGNOSIS — R79.89 ELEVATED LFTS: ICD-10-CM

## 2019-05-27 LAB
A2 MACROGLOB SERPL-MCNC: 453 MG/DL (ref 110–276)
ALBUMIN SERPL-MCNC: 3.6 G/DL (ref 3.5–4.8)
ALP SERPL-CCNC: 75 IU/L (ref 39–117)
ALT SERPL W P-5'-P-CCNC: 308 IU/L (ref 0–55)
ALT SERPL-CCNC: 236 IU/L (ref 0–44)
APO A-I SERPL-MCNC: 155 MG/DL (ref 101–178)
AST SERPL-CCNC: 210 IU/L (ref 0–40)
BILIRUB DIRECT SERPL-MCNC: 0.63 MG/DL (ref 0–0.4)
BILIRUB SERPL-MCNC: 0.9 MG/DL (ref 0–1.2)
BILIRUB SERPL-MCNC: 1.2 MG/DL (ref 0–1.2)
CMV IGM SERPL IA-ACNC: <30 AU/ML (ref 0–29.9)
COMMENT: ABNORMAL
EBV EA IGG SER-ACNC: <9 U/ML (ref 0–8.9)
EBV NA IGG SER IA-ACNC: 228 U/ML (ref 0–17.9)
EBV PATRN SPEC IB-IMP: ABNORMAL
EBV VCA IGG SER IA-ACNC: >600 U/ML (ref 0–17.9)
EBV VCA IGM SER IA-ACNC: <36 U/ML (ref 0–35.9)
FERRITIN SERPL-MCNC: 1499 NG/ML (ref 30–400)
FIBROSIS SCORING:: ABNORMAL
FIBROSIS STAGE SERPL QL: ABNORMAL
GGT SERPL-CCNC: 259 IU/L (ref 0–65)
HAPTOGLOB SERPL-MCNC: 76 MG/DL (ref 34–200)
HAV AB SER QL IA: NEGATIVE
HBV SURFACE AB SER-ACNC: 7.8 MIU/ML
HCV GENTYP SERPL NAA+PROBE: NORMAL
HCV PLEASE NOTE: NORMAL
INTERPRETATIONS: ABNORMAL
IRON SATN MFR SERPL: 71 % (ref 15–55)
IRON SERPL-MCNC: 203 UG/DL (ref 38–169)
LABCORP COMMENT: NORMAL
LIVER FIBR SCORE SERPL CALC.FIBROSURE: 0.93 (ref 0–0.21)
NECROINFLAMM ACTIVITY SCORING:: ABNORMAL
NECROINFLAMMATORY ACT GRADE SERPL QL: ABNORMAL
NECROINFLAMMATORY ACT SCORE SERPL: 0.96 (ref 0–0.17)
PROT SERPL-MCNC: 6.3 G/DL (ref 6–8.5)
SERVICE CMNT-IMP: ABNORMAL
TIBC SERPL-MCNC: 285 UG/DL (ref 250–450)
UIBC SERPL-MCNC: 82 UG/DL (ref 111–343)

## 2019-05-30 ENCOUNTER — HOSPITAL ENCOUNTER (OUTPATIENT)
Dept: ULTRASOUND IMAGING | Facility: CLINIC | Age: 74
Discharge: HOME | End: 2019-05-30
Payer: COMMERCIAL

## 2019-05-30 DIAGNOSIS — B18.2 HEP C W/O COMA, CHRONIC (HCC): ICD-10-CM

## 2019-05-30 PROCEDURE — 76705 ECHO EXAM OF ABDOMEN: CPT

## 2019-06-03 ENCOUNTER — TELEPHONE (OUTPATIENT)
Dept: GASTROENTEROLOGY | Facility: CLINIC | Age: 74
End: 2019-06-03

## 2019-06-03 LAB — HFE GENE MUT ANL BLD/T: NORMAL

## 2019-06-04 ENCOUNTER — TELEPHONE (OUTPATIENT)
Dept: GASTROENTEROLOGY | Facility: CLINIC | Age: 74
End: 2019-06-04

## 2019-06-07 ENCOUNTER — OFFICE VISIT (OUTPATIENT)
Dept: FAMILY MEDICINE CLINIC | Facility: CLINIC | Age: 74
End: 2019-06-07
Payer: COMMERCIAL

## 2019-06-07 VITALS
WEIGHT: 248 LBS | DIASTOLIC BLOOD PRESSURE: 60 MMHG | SYSTOLIC BLOOD PRESSURE: 100 MMHG | HEIGHT: 71 IN | BODY MASS INDEX: 34.72 KG/M2 | HEART RATE: 72 BPM | TEMPERATURE: 98.9 F | RESPIRATION RATE: 24 BRPM

## 2019-06-07 DIAGNOSIS — J96.11 CHRONIC RESPIRATORY FAILURE WITH HYPOXIA (HCC): ICD-10-CM

## 2019-06-07 DIAGNOSIS — M62.838 MUSCLE SPASM: ICD-10-CM

## 2019-06-07 DIAGNOSIS — E83.119 HEMOCHROMATOSIS, UNSPECIFIED HEMOCHROMATOSIS TYPE: Primary | ICD-10-CM

## 2019-06-07 DIAGNOSIS — B18.2 HEP C W/O COMA, CHRONIC (HCC): ICD-10-CM

## 2019-06-07 DIAGNOSIS — E66.9 OBESITY (BMI 30.0-34.9): ICD-10-CM

## 2019-06-07 DIAGNOSIS — J84.9 ILD (INTERSTITIAL LUNG DISEASE) (HCC): ICD-10-CM

## 2019-06-07 PROCEDURE — 3008F BODY MASS INDEX DOCD: CPT | Performed by: FAMILY MEDICINE

## 2019-06-07 PROCEDURE — 1160F RVW MEDS BY RX/DR IN RCRD: CPT | Performed by: FAMILY MEDICINE

## 2019-06-07 PROCEDURE — 99215 OFFICE O/P EST HI 40 MIN: CPT | Performed by: FAMILY MEDICINE

## 2019-06-07 PROCEDURE — 90471 IMMUNIZATION ADMIN: CPT | Performed by: FAMILY MEDICINE

## 2019-06-07 PROCEDURE — 1036F TOBACCO NON-USER: CPT | Performed by: FAMILY MEDICINE

## 2019-06-07 PROCEDURE — 90636 HEP A/HEP B VACC ADULT IM: CPT | Performed by: FAMILY MEDICINE

## 2019-06-07 RX ORDER — PREDNISONE 10 MG/1
10 TABLET ORAL DAILY
COMMUNITY
Start: 2019-05-15

## 2019-06-07 RX ORDER — CHLORAL HYDRATE 500 MG
1000 CAPSULE ORAL DAILY
COMMUNITY

## 2019-06-13 ENCOUNTER — TELEPHONE (OUTPATIENT)
Dept: FAMILY MEDICINE CLINIC | Facility: CLINIC | Age: 74
End: 2019-06-13

## 2019-06-19 ENCOUNTER — TELEPHONE (OUTPATIENT)
Dept: GASTROENTEROLOGY | Facility: CLINIC | Age: 74
End: 2019-06-19

## 2019-06-19 DIAGNOSIS — B18.2 HEP C W/O COMA, CHRONIC (HCC): Primary | ICD-10-CM

## 2019-06-30 DIAGNOSIS — I10 ESSENTIAL HYPERTENSION: ICD-10-CM

## 2019-07-01 RX ORDER — LOSARTAN POTASSIUM 50 MG/1
TABLET ORAL
Qty: 90 TABLET | Refills: 1 | OUTPATIENT
Start: 2019-07-01

## 2019-07-03 LAB
ALBUMIN SERPL-MCNC: 3.8 G/DL (ref 3.5–4.8)
ALBUMIN/GLOB SERPL: 1.5 {RATIO} (ref 1.2–2.2)
ALP SERPL-CCNC: 57 IU/L (ref 39–117)
ALT SERPL-CCNC: 110 IU/L (ref 0–44)
AST SERPL-CCNC: 55 IU/L (ref 0–40)
BASOPHILS # BLD AUTO: 0.1 X10E3/UL (ref 0–0.2)
BASOPHILS NFR BLD AUTO: 1 %
BILIRUB SERPL-MCNC: 1 MG/DL (ref 0–1.2)
BUN SERPL-MCNC: 7 MG/DL (ref 8–27)
BUN/CREAT SERPL: 8 (ref 10–24)
CALCIUM SERPL-MCNC: 9.7 MG/DL (ref 8.6–10.2)
CHLORIDE SERPL-SCNC: 98 MMOL/L (ref 96–106)
CO2 SERPL-SCNC: 22 MMOL/L (ref 20–29)
CREAT SERPL-MCNC: 0.83 MG/DL (ref 0.76–1.27)
EOSINOPHIL # BLD AUTO: 0.4 X10E3/UL (ref 0–0.4)
EOSINOPHIL NFR BLD AUTO: 5 %
ERYTHROCYTE [DISTWIDTH] IN BLOOD BY AUTOMATED COUNT: 13 % (ref 12.3–15.4)
GLOBULIN SER-MCNC: 2.6 G/DL (ref 1.5–4.5)
GLUCOSE SERPL-MCNC: 112 MG/DL (ref 65–99)
HCT VFR BLD AUTO: 42.1 % (ref 37.5–51)
HCV RNA SERPL NAA+PROBE-ACNC: NORMAL IU/ML
HGB BLD-MCNC: 14.5 G/DL (ref 13–17.7)
IMM GRANULOCYTES # BLD: 0 X10E3/UL (ref 0–0.1)
IMM GRANULOCYTES NFR BLD: 0 %
LYMPHOCYTES # BLD AUTO: 1.7 X10E3/UL (ref 0.7–3.1)
LYMPHOCYTES NFR BLD AUTO: 20 %
MCH RBC QN AUTO: 33 PG (ref 26.6–33)
MCHC RBC AUTO-ENTMCNC: 34.4 G/DL (ref 31.5–35.7)
MCV RBC AUTO: 96 FL (ref 79–97)
MONOCYTES # BLD AUTO: 0.9 X10E3/UL (ref 0.1–0.9)
MONOCYTES NFR BLD AUTO: 11 %
NEUTROPHILS # BLD AUTO: 5.3 X10E3/UL (ref 1.4–7)
NEUTROPHILS NFR BLD AUTO: 63 %
PLATELET # BLD AUTO: 220 X10E3/UL (ref 150–450)
POTASSIUM SERPL-SCNC: 3.6 MMOL/L (ref 3.5–5.2)
PROT SERPL-MCNC: 6.4 G/DL (ref 6–8.5)
RBC # BLD AUTO: 4.39 X10E6/UL (ref 4.14–5.8)
SL AMB EGFR AFRICAN AMERICAN: 100 ML/MIN/1.73
SL AMB EGFR NON AFRICAN AMERICAN: 87 ML/MIN/1.73
SODIUM SERPL-SCNC: 138 MMOL/L (ref 134–144)
TEST INFORMATION: NORMAL
WBC # BLD AUTO: 8.4 X10E3/UL (ref 3.4–10.8)

## 2019-07-09 DIAGNOSIS — I10 ESSENTIAL HYPERTENSION: ICD-10-CM

## 2019-07-09 RX ORDER — LOSARTAN POTASSIUM 50 MG/1
50 TABLET ORAL DAILY
Qty: 90 TABLET | Refills: 1 | Status: SHIPPED | OUTPATIENT
Start: 2019-07-09 | End: 2019-10-08 | Stop reason: SDUPTHER

## 2019-07-15 ENCOUNTER — OFFICE VISIT (OUTPATIENT)
Dept: GASTROENTEROLOGY | Facility: CLINIC | Age: 74
End: 2019-07-15
Payer: COMMERCIAL

## 2019-07-15 VITALS
HEART RATE: 96 BPM | BODY MASS INDEX: 34.65 KG/M2 | HEIGHT: 70 IN | DIASTOLIC BLOOD PRESSURE: 82 MMHG | SYSTOLIC BLOOD PRESSURE: 128 MMHG | WEIGHT: 242 LBS

## 2019-07-15 DIAGNOSIS — R79.89 ELEVATED LFTS: ICD-10-CM

## 2019-07-15 DIAGNOSIS — B18.2 HEP C W/O COMA, CHRONIC (HCC): Primary | ICD-10-CM

## 2019-07-15 DIAGNOSIS — Z86.010 HISTORY OF COLON POLYPS: ICD-10-CM

## 2019-07-15 PROCEDURE — 99213 OFFICE O/P EST LOW 20 MIN: CPT | Performed by: INTERNAL MEDICINE

## 2019-07-15 NOTE — PROGRESS NOTES
[address] [facility] - Outpatient Follow-up Note  Kassie May 76 y o  male MRN: [id number]  Encounter: [id number]    ASSESSMENT AND PLAN:      1  Hep C w/o coma, chronic (HCC)  Chronic hepatitis C responding to antiviral therapy  HCV RNA undetectable after 1 month  Continue with antivirals and follow-up labs to verify sustained response as planned  2  Elevated LFTs  Likely has multiple issues  In addition to hepatitis-C has hemochromatosis  May also be related to medications  Transaminases improved after initiation of antiviral therapy  Continue and monitor  3  History of colon polyps  Adenomatous polyps found on colonoscopy in February 2018  Five year follow-up recommended  Followup Appointment:  1 year or as needed  ______________________________________________________________________    Chief Complaint   Patient presents with    Follow up to [place] [id number] [facility]     HPI:  Tolerating antiviral therapy  No GI symptoms  Disuses labs, HCV undetectable  Hb stable  LFT's still elevated but improved  No other GI issues  Up-to-date with colorectal cancer screening  Historical Information   Past Medical History:   Diagnosis Date    Allergic     Anxiety     Colon polyp     Hearing loss     Hypertension     Interstitial lung disease ([facility] )     Lung disease     diagnosed in 2013     Osteoarthritis involving multiple joints on both sides of body     Pulmonary fibrosis ([facility] )      Past Surgical History:   Procedure Laterality Date    BRONCHOSCOPY      CARPAL TUNNEL RELEASE      CATARACT EXTRACTION      COLONOSCOPY  02/2018    Adenomatous polyps, left-sided diverticulosis, internal hemorrhoids      LUNG BIOPSY      PA COLONOSCOPY FLX DX W/COLLJ SPEC WHEN PFRMD N/A 2/21/2018    Procedure: COLONOSCOPY;  Surgeon: Kylah Zelaya MD;  Location:  MAIN OR;  Service: Gastroenterology    ROTATOR CUFF REPAIR      SHOULDER SURGERY Left     TONSILLECTOMY      ULNAR COLLATERAL LIGAMENT RECONSTRUCTION      ulnar artery removed     Social History     Substance and Sexual Activity   Alcohol Use Not Currently    Frequency: Never    Comment: social     Social History     Substance and Sexual Activity   Drug Use No     Social History     Tobacco Use   Smoking Status Former Smoker    Packs/day: 0 50    Years: 50 00    Pack years: 25 00    Types: Cigarettes    Last attempt to quit: 2/12/2009    Years since quitting: 10 4   Smokeless Tobacco Never Used   Tobacco Comment    smoking on an off      Family History   Problem Relation Age of Onset    Cancer Mother     Cholecystitis Father     Emphysema Father     Drug abuse Brother     Stroke Family          Current Outpatient Medications:     acetaminophen (TYLENOL) 325 mg tablet    cetirizine (ZyrTEC) 10 mg tablet    fluticasone (FLONASE) 50 mcg/act nasal spray    furosemide (LASIX) 40 mg tablet    Glecaprevir-Pibrentasvir 100-40 MG TABS    losartan (COZAAR) 50 mg tablet    Milk Thistle 175 MG CAPS    mycophenolate (CELLCEPT) 500 mg tablet    Nerve Stimulator (TENS THERAPY PAIN RELIEF) DAVID    Nerve Stimulator (TENS THERAPY PAIN RELIEF) DAVID    Nutritional Supplements (RESTORE-X PO)    Omega-3 Fatty Acids (FISH OIL) 1,000 mg    predniSONE 10 mg tablet    Psyllium ([employer] NATURAL DAILY FIBER) 48 57 % POWD    zinc gluconate 50 mg tablet  Allergies   Allergen Reactions    Sulfa Antibiotics Hives       10 Point REVIEW OF SYSTEMS IS OTHERWISE NEGATIVE  PHYSICAL EXAM:    Blood pressure 128/82, pulse 96, height 5' 10" (1 778 m), weight 110 kg (242 lb)  Body mass index is 34 72 kg/m²  General Appearance:  Alert, cooperative, no distress  On nasal cannula O2  HEENT:  Normocephalic, atraumatic, anicteric  Neck: Supple, symmetrical, trachea midline  Lungs:  Decreased breath sounds bilaterally; no rales, rhonchi or wheezing; respirations unlabored   Heart: Regular rate and rhythm; no murmur, rub, or gallop    Abdomen: Soft, non-tender, non-distended; normal bowel sounds; no masses, no organomegaly   Rectal:  Deferred   Extremities:  No cyanosis, clubbing or edema   Skin:  No jaundice, rashes, or lesions       Lab Results:   Lab Results   Component Value Date    WBC 8 4 07/01/2019    HGB 14 5 07/01/2019    HCT 42 1 07/01/2019    MCV 96 07/01/2019     07/01/2019     Lab Results   Component Value Date    K 3 6 07/01/2019    CL 98 07/01/2019    CO2 22 07/01/2019    BUN 7 (L) 07/01/2019    CREATININE 0 83 07/01/2019    AST 55 (H) 07/01/2019     (H) 07/01/2019     Lab Results   Component Value Date    IRON 203 (H) 05/22/2019    TIBC 285 05/22/2019    FERRITIN 1,499 (H) 05/22/2019     Lab Results   Component Value Date    LIPASE 58 05/13/2019       Radiology Results:   No results found

## 2019-07-15 NOTE — LETTER
July 15, 2019     Vikas Tapia DO  [address]  [address]  [facility] [address]    Patient: Myriam Levy   YOB: 1945   Date of Visit: 7/15/2019       Dear Dr Minor Eisenmenger: Thank you for referring Myriam Levy to me for evaluation  Below are my notes for this consultation  If you have questions, please do not hesitate to call me  I look forward to following your patient along with you  Sincerely,        Ivis Gallegos MD        CC: No Recipients  Ivis Gallegos MD  7/15/2019 10:23 AM  Sign at close encounter  [address] [facility] - Outpatient Follow-up Note  Myriam Levy 76 y o  male MRN: [id number]  Encounter: [id number]    ASSESSMENT AND PLAN:      1  Hep C w/o coma, chronic (HCC)  Chronic hepatitis C responding to antiviral therapy  HCV RNA undetectable after 1 month  Continue with antivirals and follow-up labs to verify sustained response as planned  2  Elevated LFTs  Likely has multiple issues  In addition to hepatitis-C has hemochromatosis  May also be related to medications  Transaminases improved after initiation of antiviral therapy  Continue and monitor  3  History of colon polyps  Adenomatous polyps found on colonoscopy in February 2018  Five year follow-up recommended  Followup Appointment:  1 year or as needed  ______________________________________________________________________    Chief Complaint   Patient presents with    Follow up to [address]     HPI:  Tolerating antiviral therapy  No GI symptoms  Disuses labs, HCV undetectable  Hb stable  LFT's still elevated but improved  No other GI issues  Up-to-date with colorectal cancer screening      Historical Information   Past Medical History:   Diagnosis Date    Allergic     Anxiety     Colon polyp     Hearing loss     Hypertension     Interstitial lung disease ([address] )     Lung disease     diagnosed in 2013     Osteoarthritis involving multiple joints on both sides of body     Pulmonary fibrosis (HCC)      Past Surgical History:   Procedure Laterality Date    BRONCHOSCOPY      CARPAL TUNNEL RELEASE      CATARACT EXTRACTION      COLONOSCOPY  02/2018    Adenomatous polyps, left-sided diverticulosis, internal hemorrhoids      LUNG BIOPSY      [place] COLONOSCOPY FLX DX W/COLLJ SPEC WHEN PFRMD N/A 2/21/2018    Procedure: COLONOSCOPY;  Surgeon: Oscar Edward MD;  [facility];  Service: Gastroenterology    ROTATOR CUFF REPAIR      SHOULDER SURGERY Left     TONSILLECTOMY      ULNAR COLLATERAL LIGAMENT RECONSTRUCTION      ulnar artery removed     Social History     Substance and Sexual Activity   Alcohol Use Not Currently    Frequency: Never    Comment: social     Social History     Substance and Sexual Activity   Drug Use No     Social History     Tobacco Use   Smoking Status Former Smoker    Packs/day: 0 50    Years: 50 00    Pack years: 25 00    Types: Cigarettes    Last attempt to quit: 2/12/2009    Years since quitting: 10 4   Smokeless Tobacco Never Used   Tobacco Comment    smoking on an off      Family History   Problem Relation Age of Onset    Cancer Mother     Cholecystitis Father     Emphysema Father     Drug abuse Brother     Stroke Family          Current Outpatient Medications:     acetaminophen (TYLENOL) 325 mg tablet    cetirizine (ZyrTEC) 10 mg tablet    fluticasone (FLONASE) 50 mcg/act nasal spray    furosemide (LASIX) 40 mg tablet    Glecaprevir-Pibrentasvir 100-40 MG TABS    losartan (COZAAR) 50 mg tablet    Milk Thistle 175 MG CAPS    mycophenolate (CELLCEPT) 500 mg tablet    Nerve Stimulator (TENS THERAPY PAIN RELIEF) DAVID    Nerve Stimulator (TENS THERAPY PAIN RELIEF) DAVID    Nutritional Supplements (RESTORE-X PO)    Omega-3 Fatty Acids (FISH OIL) 1,000 mg    predniSONE 10 mg tablet    Psyllium ([employer] NATURAL DAILY FIBER) 48 57 % POWD    zinc gluconate 50 mg tablet  Allergies   Allergen Reactions    Sulfa Antibiotics Hives       10 Point REVIEW OF SYSTEMS IS OTHERWISE NEGATIVE  PHYSICAL EXAM:    Blood pressure 128/82, pulse 96, height 5' 10" (1 778 m), weight 110 kg (242 lb)  Body mass index is 34 72 kg/m²  General Appearance:  Alert, cooperative, no distress  On nasal cannula O2  HEENT:  Normocephalic, atraumatic, anicteric  Neck: Supple, symmetrical, trachea midline  Lungs:  Decreased breath sounds bilaterally; no rales, rhonchi or wheezing; respirations unlabored   Heart: Regular rate and rhythm; no murmur, rub, or gallop  Abdomen:   Soft, non-tender, non-distended; normal bowel sounds; no masses, no organomegaly   Rectal:  Deferred   Extremities:  No cyanosis, clubbing or edema   Skin:  No jaundice, rashes, or lesions       Lab Results:   Lab Results   Component Value Date    WBC 8 4 07/01/2019    HGB 14 5 07/01/2019    HCT 42 1 07/01/2019    MCV 96 07/01/2019     07/01/2019     Lab Results   Component Value Date    K 3 6 07/01/2019    CL 98 07/01/2019    CO2 22 07/01/2019    BUN 7 (L) 07/01/2019    CREATININE 0 83 07/01/2019    AST 55 (H) 07/01/2019     (H) 07/01/2019     Lab Results   Component Value Date    IRON 203 (H) 05/22/2019    TIBC 285 05/22/2019    FERRITIN 1,499 (H) 05/22/2019     Lab Results   Component Value Date    LIPASE 58 05/13/2019       Radiology Results:   No results found

## 2019-07-15 NOTE — PATIENT INSTRUCTIONS
Complete antiviral therapy as planned  Follow-up hepatitis-C labs for 3 months post treatment as planned  Call if any GI issues

## 2019-08-05 ENCOUNTER — CONSULT (OUTPATIENT)
Dept: HEMATOLOGY ONCOLOGY | Facility: HOSPITAL | Age: 74
End: 2019-08-05
Payer: COMMERCIAL

## 2019-08-05 VITALS
OXYGEN SATURATION: 89 % | SYSTOLIC BLOOD PRESSURE: 104 MMHG | TEMPERATURE: 98.4 F | WEIGHT: 242 LBS | DIASTOLIC BLOOD PRESSURE: 63 MMHG | BODY MASS INDEX: 34.65 KG/M2 | RESPIRATION RATE: 16 BRPM | HEIGHT: 70 IN | HEART RATE: 93 BPM

## 2019-08-05 DIAGNOSIS — E83.119 HEMOCHROMATOSIS, UNSPECIFIED HEMOCHROMATOSIS TYPE: Primary | ICD-10-CM

## 2019-08-05 PROCEDURE — 99205 OFFICE O/P NEW HI 60 MIN: CPT | Performed by: INTERNAL MEDICINE

## 2019-08-05 NOTE — PROGRESS NOTES
Oncology Outpatient Consult Note  Mk Mccoy 76 y o  male MRN: @ Encounter: [id number]        Date:  8/5/2019        CC:  Hepatitis C and cirrhosis with elevated ferritin with a question of hemochromatosis  HPI:  Mk Mccoy is seen for initial consultation 8/5/2019 regarding Question of hemochromatosis  The patient was newly diagnosed with hepatitis C and actually recently finished up treatment for this  In the midst of this he had iron studies drawn which showed an elevated ferritin and an elevated iron saturation  Hemochromatosis testing was done and the genetic testing was negative for the common mutations  The patient was referred twice for further evaluation  As far as symptoms are concerned he states he was fatigued and not feeling very well a few months ago but now actually is feeling somewhat better than previously  A review of his blood work reveals Testing that was negative for hemochromatosis mutation  His ferritin is May was 1499 with A serum iron that was slightly elevated at 203 and an iron saturation of 71%  He was on iron and vitamin C previously  I don't see any results from more recently where this was repeated  Again Ferritin can be an acute phase reactant So is not diagnostic for hemochromatosis  The genetic testing which is more specific was negative in this patient  He does have documented cirrhosis on an ultrasound  An MRI has never been done and I will order one to quantify and look for iron in the liver  As far as symptoms are concerned he does have COPD and is on oxygen  Denies any nausea denies any vomiting there is any diarrhea  Denies any jaundice  The rest of his 14 point review of systems today was negative  Test Results:    Imaging: No results found      Labs:   Lab Results   Component Value Date    WBC 8 4 07/01/2019    HGB 14 5 07/01/2019    HCT 42 1 07/01/2019    MCV 96 07/01/2019     07/01/2019     Lab Results   Component Value Date    K 3 6 07/01/2019 CL 98 07/01/2019    CO2 22 07/01/2019    BUN 7 (L) 07/01/2019    CREATININE 0 83 07/01/2019    AST 55 (H) 07/01/2019     (H) 07/01/2019         Lab Results   Component Value Date    IRON 203 (H) 05/22/2019    TIBC 285 05/22/2019    FERRITIN 1,499 (H) 05/22/2019         ROS: As stated in history of present illness otherwise her 14 point review of systems today was negative  Active Problems:   Patient Active Problem List   Diagnosis    Screening for colon cancer    Hypersensitivity pneumonitis (HCC)    High blood pressure    ILD (interstitial lung disease) (HCC)    Osteoarthritis    Viral URI with cough    Chronic respiratory failure with hypoxia (HCC)    Snoring    Allergic rhinitis    Elevated LFTs    Hep C w/o coma, chronic (HCC)       Past Medical History:   Past Medical History:   Diagnosis Date    Allergic     Anxiety     Colon polyp     Hearing loss     Hypertension     Interstitial lung disease ([address] )     Lung disease     diagnosed in 2013     Osteoarthritis involving multiple joints on both sides of body     Pulmonary fibrosis ([address] )        Surgical History:   Past Surgical History:   Procedure Laterality Date    BRONCHOSCOPY      CARPAL TUNNEL RELEASE      CATARACT EXTRACTION      COLONOSCOPY  02/2018    Adenomatous polyps, left-sided diverticulosis, internal hemorrhoids   LUNG BIOPSY      [place] COLONOSCOPY FLX DX W/COLLJ SPEC WHEN PFRMD N/A 2/21/2018    Procedure: COLONOSCOPY;  Surgeon: Shonda Wise MD;  Location: [facility];  Service: Gastroenterology    ROTATOR CUFF REPAIR      SHOULDER SURGERY Left     TONSILLECTOMY      ULNAR COLLATERAL LIGAMENT RECONSTRUCTION      ulnar artery removed       Family History:    Family History   Problem Relation Age of Onset    Cancer Mother     Cholecystitis Father     Emphysema Father     Drug abuse Brother     Stroke Family        Cancer-related family history includes Cancer in his mother      Social History:   Social History     Socioeconomic History    Marital status: Unknown     Spouse name: Not on file    Number of children: Not on file    Years of education: Not on file    Highest education level: Not on file   Occupational History    Not on file   Social Needs    Financial resource strain: Not on file    Food insecurity:     Worry: Not on file     Inability: Not on file    Transportation needs:     Medical: Not on file     Non-medical: Not on file   Tobacco Use    Smoking status: Former Smoker     Packs/day: 0 50     Years: 50 00     Pack years: 25 00     Types: Cigarettes     Last attempt to quit: 2/12/2009     Years since quitting: 10 4    Smokeless tobacco: Never Used    Tobacco comment: smoking on an off    Substance and Sexual Activity    Alcohol use: Not Currently     Frequency: Never     Comment: social    Drug use: No    Sexual activity: Not Currently     Partners: Female   Lifestyle    Physical activity:     Days per week: Not on file     Minutes per session: Not on file    Stress: Not on file   Relationships    Social connections:     Talks on phone: Not on file     Gets together: Not on file     Attends [employer] service: Not on file     Active member of club or organization: Not on file     Attends meetings of clubs or organizations: Not on file     Relationship status: Not on file    Intimate partner violence:     Fear of current or ex partner: Not on file     Emotionally abused: Not on file     Physically abused: Not on file     Forced sexual activity: Not on file   Other Topics Concern    Not on file   Social History Narrative    Not on file       Current Medications:   Current Outpatient Medications   Medication Sig Dispense Refill    acetaminophen (TYLENOL) 325 mg tablet Take 650 mg by mouth every 6 (six) hours as needed for mild pain      cetirizine (ZyrTEC) 10 mg tablet Take 10 mg by mouth daily      fluticasone (FLONASE) 50 mcg/act nasal spray 1 spray daily  3    furosemide (LASIX) 40 mg tablet Take 1 tablet (40 mg total) by mouth daily 90 tablet 1    losartan (COZAAR) 50 mg tablet Take 1 tablet (50 mg total) by mouth daily 90 tablet 1    Milk Thistle 175 MG CAPS Take by mouth daily      mycophenolate (CELLCEPT) 500 mg tablet Take 2,000 mg by mouth every 12 (twelve) hours      Nerve Stimulator (TENS THERAPY PAIN RELIEF) DAVID Use as directed 1 Device 0    Nerve Stimulator (TENS THERAPY PAIN RELIEF) DAVID Use as directed 1 Device 0    Nutritional Supplements (RESTORE-X PO) Take by mouth daily      Omega-3 Fatty Acids (FISH OIL) 1,000 mg Take 1,000 mg by mouth daily      predniSONE 10 mg tablet Take 10 mg by mouth daily      Psyllium ([employer] NATURAL DAILY FIBER) 48 57 % POWD Take by mouth      zinc gluconate 50 mg tablet Take 50 mg by mouth daily       No current facility-administered medications for this visit  Allergies: Allergies   Allergen Reactions    Sulfa Antibiotics Hives         Physical Exam:    Body surface area is 2 27 meters squared  Wt Readings from Last 3 Encounters:   08/05/19 110 kg (242 lb)   07/15/19 110 kg (242 lb)   06/07/19 112 kg (248 lb)        Temp Readings from Last 3 Encounters:   08/05/19 98 4 °F (36 9 °C) (Tympanic)   06/07/19 98 9 °F (37 2 °C)   03/25/19 98 7 °F (37 1 °C) (Tympanic)        BP Readings from Last 3 Encounters:   08/05/19 104/63   07/15/19 128/82   06/07/19 100/60         Pulse Readings from Last 3 Encounters:   08/05/19 93   07/15/19 96   06/07/19 72     @LASTSAO2(3)@    Physical Exam     Constitutional   General appearance: No acute distress, well appearing and well nourished  Eyes   Conjunctiva and lids: No swelling, erythema or discharge  Pupils and irises: Equal, round and reactive to light  Ears, Nose, Mouth, and Throat   External inspection of ears and nose: Normal     Nasal mucosa, septum, and turbinates: Normal without edema or erythema  Oropharynx: Normal with no erythema, edema, exudate or lesions  Pulmonary   Respiratory effort: No increased work of breathing or signs of respiratory distress  Auscultation of lungs: Clear to auscultation  Cardiovascular   Palpation of heart: Normal PMI, no thrills  Auscultation of heart: Normal rate and rhythm, normal S1 and S2, without murmurs  Examination of extremities for edema and/or varicosities: Normal     Carotid pulses: Normal     Abdomen   Abdomen: Non-tender, no masses  Liver and spleen: No hepatomegaly or splenomegaly  Lymphatic   Palpation of lymph nodes in neck: No lymphadenopathy  Musculoskeletal   Gait and station: Normal     Digits and nails: Normal without clubbing or cyanosis  Inspection/palpation of joints, bones, and muscles: Normal     Skin   Skin and subcutaneous tissue: Normal without rashes or lesions  Neurologic   Cranial nerves: Cranial nerves 2-12 intact  Sensation: No sensory loss  Psychiatric   Orientation to person, place, and time: Normal     Mood and affect: Normal           Assessment/ Plan:    The patient is a pleasant 72-year-old male with a past medical history of COPD who also had hepatitis C for which she got treated recently  His ferritin has been elevated and a diagnosis of hemochromatosis was made  Genetic testing was ordered which was negative  I suspect is may be related to inflammation but his iron saturation was elevated at 70% so I will get an MRI of the liver done to quantify for iron deposition  I'll see him back with the results of this and repeat iron testing  Again I had a long discussion spanning more than 45 minutes about the diagnosis and treatment options if he were proven to have the disease  I also explained my reservations based on his negative genetic testing  The patient and his wife understood and agreed to have an MRI done  I will also repeat his blood work   This will be 6 weeks from now so hopefully if this is related to the information he had at the time of treatment for his hepatitis C the ferritin will continue to improve  The patient and his wife agree  They verbalize understanding and agreement with the plan  I will see him back in 6-8 weeks with results of an MRI of the liver along with iron studies  Goals and Barriers:  Current Goal: Prolong Survival from Hemochromatosis  Barriers: None  Patient's Capacity to Self Care:  Patient able to self care  Portions of the record may have been created with voice recognition software   Occasional wrong word or "sound a like" substitutions may have occurred due to the inherent limitations of voice recognition software   Read the chart carefully and recognize, using context, where substitutions have occurred      Emergency Contacts:    Hennie Kawasaki,   Daja Benites, ,     Code Status: [unfilled]  Advance Directive and Living Will:      Power of :    POLST:

## 2019-08-15 ENCOUNTER — HOSPITAL ENCOUNTER (OUTPATIENT)
Dept: MRI IMAGING | Facility: HOSPITAL | Age: 74
Discharge: HOME | End: 2019-08-15
Attending: INTERNAL MEDICINE
Payer: COMMERCIAL

## 2019-08-15 DIAGNOSIS — E83.119 HEMOCHROMATOSIS, UNSPECIFIED HEMOCHROMATOSIS TYPE: ICD-10-CM

## 2019-08-15 PROCEDURE — A9585 GADOBUTROL INJECTION: HCPCS | Performed by: INTERNAL MEDICINE

## 2019-08-15 PROCEDURE — 74183 MRI ABD W/O CNTR FLWD CNTR: CPT

## 2019-08-15 RX ADMIN — GADOBUTROL 11 ML: 604.72 INJECTION INTRAVENOUS at 12:52

## 2019-08-22 ENCOUNTER — TELEPHONE (OUTPATIENT)
Dept: HEMATOLOGY ONCOLOGY | Facility: CLINIC | Age: 74
End: 2019-08-22

## 2019-08-22 NOTE — TELEPHONE ENCOUNTER
Patients wife called to inquire about his MRI results from 8/15/19  Results are not yet in Epic  Please call patient to advise

## 2019-08-22 NOTE — TELEPHONE ENCOUNTER
Phoned wife and informed her that MRI results were not in Epic yet  Wife would like a call back with the results when they are in  I informed wife I would forward this information to Dr Sol Gilbert nurse so she is aware

## 2019-08-26 NOTE — TELEPHONE ENCOUNTER
Called MRI dept in [facility] spoke with Yamil Stone an MRI tech    He said everything has been done on their end it has not yet been read by a radiologist   He will call the Reading Room and find out why it hasn't been and have it read as soon as asa

## 2019-09-18 ENCOUNTER — TELEPHONE (OUTPATIENT)
Dept: HEMATOLOGY ONCOLOGY | Facility: CLINIC | Age: 74
End: 2019-09-18

## 2019-09-18 NOTE — TELEPHONE ENCOUNTER
The patient's wife called in requesting a call back with the MRI results  She can be reached at [phone]  The results are in the system

## 2019-09-18 NOTE — TELEPHONE ENCOUNTER
I would tell them the blood work will help us differentiate further but the MRI shows very mild if any iron overload  The blood work in this case will be key and I will discuss results with them when I see him as I need to put it altogether I e  The blood work and the MRI results

## 2019-09-18 NOTE — TELEPHONE ENCOUNTER
I returned phone call to patients wife and read her the result  Patients wife proceeded to yell into the phone to me regarding not being able to tell her anything regarding his diagnosis and treatment until the ordered labs from 8/5 were done  I explained we could not make a plan without knowing his ferritin, iron sat and hgb  She proceeded to tell me that I was and the information was useless and I was giving her the runaround  She then said I have nothing more goodbye and hung up  Dr Luz Barcenas was made aware of the interaction and chart was documented

## 2019-09-18 NOTE — TELEPHONE ENCOUNTER
I am not familiar with this study but appears to result mild overload  Please confirm result you would like me to discuss with patient

## 2019-09-19 LAB
ALBUMIN SERPL-MCNC: 4.2 G/DL (ref 3.5–4.8)
ALBUMIN/GLOB SERPL: 1.9 {RATIO} (ref 1.2–2.2)
ALP SERPL-CCNC: 55 IU/L (ref 39–117)
ALT SERPL-CCNC: 19 IU/L (ref 0–44)
AST SERPL-CCNC: 19 IU/L (ref 0–40)
BASOPHILS # BLD AUTO: 0 X10E3/UL (ref 0–0.2)
BASOPHILS NFR BLD AUTO: 0 %
BILIRUB SERPL-MCNC: 0.5 MG/DL (ref 0–1.2)
BUN SERPL-MCNC: 8 MG/DL (ref 8–27)
BUN/CREAT SERPL: 10 (ref 10–24)
CALCIUM SERPL-MCNC: 9.5 MG/DL (ref 8.6–10.2)
CHLORIDE SERPL-SCNC: 100 MMOL/L (ref 96–106)
CO2 SERPL-SCNC: 24 MMOL/L (ref 20–29)
CREAT SERPL-MCNC: 0.81 MG/DL (ref 0.76–1.27)
EOSINOPHIL # BLD AUTO: 0.1 X10E3/UL (ref 0–0.4)
EOSINOPHIL NFR BLD AUTO: 1 %
ERYTHROCYTE [DISTWIDTH] IN BLOOD BY AUTOMATED COUNT: 13 % (ref 12.3–15.4)
FERRITIN SERPL-MCNC: 376 NG/ML (ref 30–400)
GLOBULIN SER-MCNC: 2.2 G/DL (ref 1.5–4.5)
GLUCOSE SERPL-MCNC: 123 MG/DL (ref 65–99)
HCT VFR BLD AUTO: 44.4 % (ref 37.5–51)
HGB BLD-MCNC: 14.8 G/DL (ref 13–17.7)
IMM GRANULOCYTES # BLD: 0 X10E3/UL (ref 0–0.1)
IMM GRANULOCYTES NFR BLD: 0 %
IRON SATN MFR SERPL: 29 % (ref 15–55)
IRON SERPL-MCNC: 93 UG/DL (ref 38–169)
LYMPHOCYTES # BLD AUTO: 1 X10E3/UL (ref 0.7–3.1)
LYMPHOCYTES NFR BLD AUTO: 11 %
MCH RBC QN AUTO: 31.3 PG (ref 26.6–33)
MCHC RBC AUTO-ENTMCNC: 33.3 G/DL (ref 31.5–35.7)
MCV RBC AUTO: 94 FL (ref 79–97)
MONOCYTES # BLD AUTO: 0.8 X10E3/UL (ref 0.1–0.9)
MONOCYTES NFR BLD AUTO: 9 %
NEUTROPHILS # BLD AUTO: 6.5 X10E3/UL (ref 1.4–7)
NEUTROPHILS NFR BLD AUTO: 79 %
PLATELET # BLD AUTO: 240 X10E3/UL (ref 150–450)
POTASSIUM SERPL-SCNC: 4.6 MMOL/L (ref 3.5–5.2)
PROT SERPL-MCNC: 6.4 G/DL (ref 6–8.5)
RBC # BLD AUTO: 4.73 X10E6/UL (ref 4.14–5.8)
SL AMB EGFR AFRICAN AMERICAN: 101 ML/MIN/1.73
SL AMB EGFR NON AFRICAN AMERICAN: 88 ML/MIN/1.73
SODIUM SERPL-SCNC: 138 MMOL/L (ref 134–144)
TIBC SERPL-MCNC: 317 UG/DL (ref 250–450)
UIBC SERPL-MCNC: 224 UG/DL (ref 111–343)
WBC # BLD AUTO: 8.3 X10E3/UL (ref 3.4–10.8)

## 2019-09-23 ENCOUNTER — TELEPHONE (OUTPATIENT)
Dept: FAMILY MEDICINE CLINIC | Facility: CLINIC | Age: 74
End: 2019-09-23

## 2019-09-23 ENCOUNTER — OFFICE VISIT (OUTPATIENT)
Dept: HEMATOLOGY ONCOLOGY | Facility: HOSPITAL | Age: 74
End: 2019-09-23
Payer: COMMERCIAL

## 2019-09-23 VITALS
SYSTOLIC BLOOD PRESSURE: 124 MMHG | DIASTOLIC BLOOD PRESSURE: 68 MMHG | BODY MASS INDEX: 34.65 KG/M2 | HEART RATE: 88 BPM | HEIGHT: 70 IN | TEMPERATURE: 98.1 F | WEIGHT: 242 LBS | OXYGEN SATURATION: 94 % | RESPIRATION RATE: 16 BRPM

## 2019-09-23 DIAGNOSIS — E83.119 HEMOCHROMATOSIS, UNSPECIFIED HEMOCHROMATOSIS TYPE: Primary | ICD-10-CM

## 2019-09-23 PROCEDURE — 99214 OFFICE O/P EST MOD 30 MIN: CPT | Performed by: INTERNAL MEDICINE

## 2019-09-23 RX ORDER — OMEPRAZOLE 40 MG/1
40 CAPSULE, DELAYED RELEASE ORAL DAILY
Refills: 3 | COMMUNITY
Start: 2019-08-21

## 2019-09-23 NOTE — TELEPHONE ENCOUNTER
Patients wife called  Their son is in long-term and they are trying to get him transferred to [facility]  She is asking if you would be able to write a letter stating that due to patients medical condition, that he cannot travel far  I told her I would check with you and I can call her back  [phone]   Thank you

## 2019-09-23 NOTE — PROGRESS NOTES
Hematology/Oncology Outpatient Follow- up Note  Myriam Levy 76 y o  male MRN: @ Encounter: [id number]        Date:  9/23/2019    Presenting Complaint/Diagnosis :  Hepatitis C and cirrhosis with elevated ferritin with a question of hemochromatosis  HPI:    Myriam Levy is seen for initial consultation 8/5/2019 regarding Question of hemochromatosis  The patient was newly diagnosed with hepatitis C and actually recently finished up treatment for this  In the midst of this he had iron studies drawn which showed an elevated ferritin and an elevated iron saturation  Hemochromatosis testing was done and the genetic testing was negative for the common mutations  The patient was referred twice for further evaluation  As far as symptoms are concerned he states he was fatigued and not feeling very well a few months ago but now actually is feeling somewhat better than previously  A review of his blood work reveals Testing that was negative for hemochromatosis mutation  His ferritin is May was 1499 with A serum iron that was slightly elevated at 203 and an iron saturation of 71%  He was on iron and vitamin C previously  I don't see any results from more recently where this was repeated  Again Ferritin can be an acute phase reactant So is not diagnostic for hemochromatosis  The genetic testing which is usually more specific was negative in this patient  He does have documented cirrhosis on an ultrasound  When I saw him I recommend MRI and repeat iron studies  Previous Hematologic/ Oncologic History:    Noncontributory    Current Hematologic/ Oncologic Treatment:    Workup    Interval History:    The patient returns for follow-up visit  His repeat iron studies have returned to normal  Iron saturation is now 29%  Ferritin is down to 376  I suspect his previous abnormalities were related to inflammation and his hepatitis treatment  MRI does not show major iron overload   It is within the margin of error for a normal study  The patient himself is at baseline  Denies any nausea denies any vomiting denies any diarrhea  The rest of his 14 point review of systems today was negative  Test Results:    Imaging: No results found  Labs:   Lab Results   Component Value Date    WBC 8 3 09/18/2019    HGB 14 8 09/18/2019    HCT 44 4 09/18/2019    MCV 94 09/18/2019     09/18/2019     Lab Results   Component Value Date    K 4 6 09/18/2019     09/18/2019    CO2 24 09/18/2019    BUN 8 09/18/2019    CREATININE 0 81 09/18/2019    AST 19 09/18/2019    ALT 19 09/18/2019       Lab Results   Component Value Date    IRON 93 09/18/2019    TIBC 317 09/18/2019    FERRITIN 376 09/18/2019       ROS: As stated in the history of present illness otherwise his 14 point review of systems today was negative  Active Problems:   Patient Active Problem List   Diagnosis    Screening for colon cancer    Hypersensitivity pneumonitis (HCC)    High blood pressure    ILD (interstitial lung disease) (HCC)    Osteoarthritis    Viral URI with cough    Chronic respiratory failure with hypoxia (HCC)    Snoring    Allergic rhinitis    Elevated LFTs    Hep C w/o coma, chronic (HCC)       Past Medical History:   Past Medical History:   Diagnosis Date    Allergic     Anxiety     Colon polyp     Hearing loss     Hypertension     Interstitial lung disease ([address]     Lung disease     diagnosed in 2013     Osteoarthritis involving multiple joints on both sides of body     Pulmonary fibrosis ([address] )        Surgical History:   Past Surgical History:   Procedure Laterality Date    BRONCHOSCOPY      CARPAL TUNNEL RELEASE      CATARACT EXTRACTION      COLONOSCOPY  02/2018    Adenomatous polyps, left-sided diverticulosis, internal hemorrhoids      LUNG BIOPSY      [place] COLONOSCOPY FLX DX W/COLLJ SPEC WHEN PFRMD N/A 2/21/2018    Procedure: COLONOSCOPY;  Surgeon: Oscar Edward MD;  Location:  MAIN OR;  Service: Gastroenterology    ROTATOR CUFF REPAIR      SHOULDER SURGERY Left     TONSILLECTOMY      ULNAR COLLATERAL LIGAMENT RECONSTRUCTION      ulnar artery removed       Family History:    Family History   Problem Relation Age of Onset    Cancer Mother     Cholecystitis Father     Emphysema Father     Drug abuse Brother     Stroke Family        Cancer-related family history includes Cancer in his mother      Social History:   Social History     Socioeconomic History    Marital status: Unknown     Spouse name: Not on file    Number of children: Not on file    Years of education: Not on file    Highest education level: Not on file   Occupational History    Not on file   Social Needs    Financial resource strain: Not on file    Food insecurity:     Worry: Not on file     Inability: Not on file    Transportation needs:     Medical: Not on file     Non-medical: Not on file   Tobacco Use    Smoking status: Former Smoker     Packs/day: 0 50     Years: 50 00     Pack years: 25 00     Types: Cigarettes     Last attempt to quit: 2/12/2009     Years since quitting: 10 6    Smokeless tobacco: Never Used    Tobacco comment: smoking on an off    Substance and Sexual Activity    Alcohol use: Not Currently     Frequency: Never     Comment: social    Drug use: No    Sexual activity: Not Currently     Partners: Female   Lifestyle    Physical activity:     Days per week: Not on file     Minutes per session: Not on file    Stress: Not on file   Relationships    Social connections:     Talks on phone: Not on file     Gets together: Not on file     Attends [employer] service: Not on file     Active member of club or organization: Not on file     Attends meetings of clubs or organizations: Not on file     Relationship status: Not on file    Intimate partner violence:     Fear of current or ex partner: Not on file     Emotionally abused: Not on file     Physically abused: Not on file     Forced sexual activity: Not on file   Other Topics Concern    Not on file   Social History Narrative    Not on file       Current Medications:   Current Outpatient Medications   Medication Sig Dispense Refill    acetaminophen (TYLENOL) 325 mg tablet Take 650 mg by mouth every 6 (six) hours as needed for mild pain      cetirizine (ZyrTEC) 10 mg tablet Take 10 mg by mouth daily      fluticasone (FLONASE) 50 mcg/act nasal spray 1 spray daily  3    furosemide (LASIX) 40 mg tablet Take 1 tablet (40 mg total) by mouth daily 90 tablet 1    losartan (COZAAR) 50 mg tablet Take 1 tablet (50 mg total) by mouth daily 90 tablet 1    Milk Thistle 175 MG CAPS Take by mouth daily      mycophenolate (CELLCEPT) 500 mg tablet Take 2,000 mg by mouth every 12 (twelve) hours      Nerve Stimulator (TENS THERAPY PAIN RELIEF) DAVID Use as directed 1 Device 0    Nerve Stimulator (TENS THERAPY PAIN RELIEF) DAVID Use as directed 1 Device 0    Nutritional Supplements (RESTORE-X PO) Take by mouth daily      Omega-3 Fatty Acids (FISH OIL) 1,000 mg Take 1,000 mg by mouth daily      predniSONE 10 mg tablet Take 10 mg by mouth daily      Psyllium ([employer] NATURAL DAILY FIBER) 48 57 % POWD Take by mouth      zinc gluconate 50 mg tablet Take 50 mg by mouth daily       No current facility-administered medications for this visit  Allergies: Allergies   Allergen Reactions    Sulfa Antibiotics Hives       Physical Exam:    There is no height or weight on file to calculate BSA      Wt Readings from Last 3 Encounters:   08/05/19 110 kg (242 lb)   07/15/19 110 kg (242 lb)   06/07/19 112 kg (248 lb)        Temp Readings from Last 3 Encounters:   08/05/19 98 4 °F (36 9 °C) (Tympanic)   06/07/19 98 9 °F (37 2 °C)   03/25/19 98 7 °F (37 1 °C) (Tympanic)        BP Readings from Last 3 Encounters:   08/05/19 104/63   07/15/19 128/82   06/07/19 100/60         Pulse Readings from Last 3 Encounters:   08/05/19 93   07/15/19 96   06/07/19 72         Physical Exam     Constitutional   General appearance: No acute distress, well appearing and well nourished  Eyes   Conjunctiva and lids: No swelling, erythema or discharge  Pupils and irises: Equal, round and reactive to light  Ears, Nose, Mouth, and Throat   External inspection of ears and nose: Normal     Nasal mucosa, septum, and turbinates: Normal without edema or erythema  Oropharynx: Normal with no erythema, edema, exudate or lesions  Pulmonary   Respiratory effort: No increased work of breathing or signs of respiratory distress  Auscultation of lungs: Clear to auscultation  Cardiovascular   Palpation of heart: Normal PMI, no thrills  Auscultation of heart: Normal rate and rhythm, normal S1 and S2, without murmurs  Examination of extremities for edema and/or varicosities: Normal     Carotid pulses: Normal     Abdomen   Abdomen: Non-tender, no masses  Liver and spleen: No hepatomegaly or splenomegaly  Lymphatic   Palpation of lymph nodes in neck: No lymphadenopathy  Musculoskeletal   Gait and station: Normal     Digits and nails: Normal without clubbing or cyanosis  Inspection/palpation of joints, bones, and muscles: Normal     Skin   Skin and subcutaneous tissue: Normal without rashes or lesions  Neurologic   Cranial nerves: Cranial nerves 2-12 intact  Sensation: No sensory loss  Psychiatric   Orientation to person, place, and time: Normal     Mood and affect: Normal         Assessment / Plan:    The patient is a pleasant 68-year-old male with a past medical history of COPD who also had hepatitis C for which he got treated recently  His ferritin has been elevated and a diagnosis of hemochromatosis was made  Genetic testing was ordered which was negative  I suspected is may be related to inflammation but his iron saturation was elevated at 70%  I repeated this and it is now 29%   MRI of the liver Showed the liver iron concentration is 54 (+/- 30) umol/g  (slight overload is  umol/g, moderate overload is 100-200 umol/g, and major overload is >200 umol/g )  This combined with the now normal iron saturation along with normal iron studies Along with the normal hemochromatosis mutation testing argues against hemochromatosis  This point I will refer the patient back to his primary care physician  He can see me as needed  Goals and Barriers:  Current Goal:  Prolong Survival from Possible iron overload  Barriers: None  Patient's Capacity to Self Care:  Patient  able to self care  Portions of the record may have been created with voice recognition software   Occasional wrong word or "sound a like" substitutions may have occurred due to the inherent limitations of voice recognition software   Read the chart carefully and recognize, using context, where substitutions have occurred

## 2019-10-08 DIAGNOSIS — I10 ESSENTIAL HYPERTENSION: ICD-10-CM

## 2019-10-08 RX ORDER — LOSARTAN POTASSIUM 25 MG/1
50 TABLET ORAL DAILY
Qty: 180 TABLET | Refills: 1 | Status: SHIPPED | OUTPATIENT
Start: 2019-10-08 | End: 2020-01-06 | Stop reason: SDUPTHER

## 2019-10-08 NOTE — TELEPHONE ENCOUNTER
[facility] called requesting a new script for patient's Losartan  The 50mg is not available  Can they have a new script for either the 25mg tabs take 2 daily or 100mg tab take 1/2 tab daily

## 2019-10-13 DIAGNOSIS — R60.9 EDEMA, UNSPECIFIED TYPE: ICD-10-CM

## 2019-10-14 RX ORDER — FUROSEMIDE 40 MG/1
TABLET ORAL
Qty: 90 TABLET | Refills: 1 | OUTPATIENT
Start: 2019-10-14

## 2019-11-04 ENCOUNTER — TELEPHONE (OUTPATIENT)
Dept: FAMILY MEDICINE CLINIC | Facility: CLINIC | Age: 74
End: 2019-11-04

## 2019-11-04 DIAGNOSIS — R60.9 EDEMA, UNSPECIFIED TYPE: Primary | ICD-10-CM

## 2019-11-04 RX ORDER — FUROSEMIDE 40 MG/1
40 TABLET ORAL DAILY
Qty: 90 TABLET | Refills: 1 | Status: SHIPPED | OUTPATIENT
Start: 2019-11-04 | End: 2019-11-07 | Stop reason: SDUPTHER

## 2019-11-04 NOTE — TELEPHONE ENCOUNTER
furosemide (LASIX) 40 mg tablet [[id number]    Patients wife called, patient needs Rx [facility] the [facility] in [place]  He gets a 90 day supply  Thank you

## 2019-11-07 ENCOUNTER — OFFICE VISIT (OUTPATIENT)
Dept: FAMILY MEDICINE CLINIC | Facility: CLINIC | Age: 74
End: 2019-11-07
Payer: COMMERCIAL

## 2019-11-07 VITALS
DIASTOLIC BLOOD PRESSURE: 70 MMHG | SYSTOLIC BLOOD PRESSURE: 122 MMHG | RESPIRATION RATE: 18 BRPM | BODY MASS INDEX: 33.07 KG/M2 | HEIGHT: 70 IN | WEIGHT: 231 LBS | HEART RATE: 84 BPM | TEMPERATURE: 97.4 F

## 2019-11-07 DIAGNOSIS — H61.23 BILATERAL IMPACTED CERUMEN: Primary | ICD-10-CM

## 2019-11-07 PROCEDURE — 99213 OFFICE O/P EST LOW 20 MIN: CPT | Performed by: NURSE PRACTITIONER

## 2019-11-07 PROCEDURE — 3008F BODY MASS INDEX DOCD: CPT | Performed by: NURSE PRACTITIONER

## 2019-11-07 PROCEDURE — 69210 REMOVE IMPACTED EAR WAX UNI: CPT | Performed by: NURSE PRACTITIONER

## 2019-11-07 RX ORDER — MORPHINE SULFATE 15 MG/1
15 TABLET, FILM COATED, EXTENDED RELEASE ORAL 2 TIMES DAILY
COMMUNITY
Start: 2019-10-16

## 2019-11-07 NOTE — PATIENT INSTRUCTIONS
Cerumen Impaction   WHAT YOU NEED TO KNOW:   Cerumen impaction is the blockage of the outer ear canal by tightly packed cerumen (earwax)  It is generally treated with procedures such as flushing or suctioning the ear canal or the use of instruments to remove the impaction  DISCHARGE INSTRUCTIONS:   Medicines:  · Ear drops: These are used to soften the wax in your ear  Wax softening ear drops may be bought without a prescription  Ask your healthcare provider how often you should use this medicine  Read the instructions carefully before you use the ear drops  Do the following when you put in ear drops:     ¨ Warm the drops by holding the bottle in your hands for a few minutes  Cold ear drops may make you dizzy  ¨ Lie down with the affected ear toward the ceiling  You may also stand with your head tilted to one side  ¨ Pull your ear lobe up and back, and place the correct number of drops into the ear  ¨ Keep your ear facing up for 5 to 10 minutes so the drops coat the outer ear canal      ¨ Gently clean the outer part of the ear with a cotton swab  Do not  place the cotton swab or anything inside your ear canal  This increases the risk of damaging your eardrum  · Take your medicine as directed  Contact your healthcare provider if you think your medicine is not helping or if you have side effects  Tell him of her if you are allergic to any medicine  Keep a list of the medicines, vitamins, and herbs you take  Include the amounts, and when and why you take them  Bring the list or the pill bottles to follow-up visits  Carry your medicine list with you in case of an emergency  Follow up with your healthcare provider as directed:  Write down your questions so you remember to ask them during your visits  Contact your healthcare provider if:   · You have a fever  · You have trouble hearing or ringing in your ear  · You have questions about your condition or care    Return to the emergency department if:   · You feel dizzy  · You have discharge or blood coming out of your ear  · Your ear pain does not go away or gets worse  © 2017 [employer] Information is for End User's use only and may not be sold, redistributed or otherwise used for commercial purposes  All illustrations and images included in CareNotes® are the copyrighted property of [employer]  or [employer]  The above information is an  only  It is not intended as medical advice for individual conditions or treatments  Talk to your doctor, nurse or pharmacist before following any medical regimen to see if it is safe and effective for you

## 2019-11-07 NOTE — PROGRESS NOTES
Assessment/Plan   Problem List Items Addressed This Visit     None      Visit Diagnoses     Bilateral impacted cerumen    -  Primary          BMI Counseling: Body mass index is 33 15 kg/m²  The BMI is above normal  Nutrition recommendations include decreasing portion sizes, encouraging healthy choices of fruits and vegetables, consuming healthier snacks, moderation in carbohydrate intake, increasing intake of lean protein and reducing intake of cholesterol  Exercise recommendations include strength training exercises  No pharmacotherapy was ordered  Depression Screening and Follow-up Plan: Clincally patient does not have depression  No treatment is required  Chief Complaint   Patient presents with    Cerumen Impaction     ear lavage       Subjective   Patient ID: Vic Floyd is a 76 y o  male  Vitals:    11/07/19 1307   BP: 122/70   Pulse: 84   Resp: 18   Temp: (!) 97 4 °F (36 3 °C)     Ear Fullness    There is pain in both ears  This is a recurrent problem  The current episode started 1 to 4 weeks ago  The problem has been unchanged  There has been no fever  The pain is at a severity of 0/10  The patient is experiencing no pain  Associated symptoms include hearing loss  He has tried nothing for the symptoms  The treatment provided no relief  His past medical history is significant for hearing loss  There is no history of a chronic ear infection or a tympanostomy tube  The following portions of the patient's history were reviewed and updated as appropriate: allergies, current medications, past family history, past medical history, past surgical history and problem list     Review of Systems   Constitutional: Negative  Negative for chills and fatigue  HENT: Positive for hearing loss  Eyes: Negative  Respiratory: Negative  Shortness of breath: O2 dependent  Cardiovascular: Negative  Gastrointestinal: Negative  Endocrine: Negative  Genitourinary: Negative  Musculoskeletal: Negative  Skin: Negative  Allergic/Immunologic: Negative  Neurological: Negative  Hematological: Negative  Psychiatric/Behavioral: Negative  Objective     Physical Exam   Constitutional: He is oriented to person, place, and time  He appears well-developed and well-nourished  No distress  HENT:   Head: Normocephalic and atraumatic  Right Ear: External ear normal    Left Ear: External ear normal    Nose: Nose normal    Mouth/Throat: Oropharynx is clear and moist  No oropharyngeal exudate  Post removal or cerumen exam was completed   Eyes: Conjunctivae are normal  Right eye exhibits no discharge  Left eye exhibits no discharge  Neck: Normal range of motion  Neck supple  Cardiovascular: Normal rate and regular rhythm  Pulmonary/Chest: Effort normal and breath sounds normal    Oxygen 2 L NC   Abdominal: Soft  Musculoskeletal: Normal range of motion  Neurological: He is alert and oriented to person, place, and time  Skin: Skin is warm and dry  Capillary refill takes less than 2 seconds  He is not diaphoretic  Psychiatric: He has a normal mood and affect  His behavior is normal  Judgment and thought content normal    Nursing note and vitals reviewed      Allergies   Allergen Reactions    Sulfa Antibiotics Hives     Patient Active Problem List   Diagnosis    Screening for colon cancer    Hypersensitivity pneumonitis (HCC)    High blood pressure    [facility]D [facility]inter[facility] disease) ([facility])    Osteoarthritis    Viral URI with cough    Chronic respiratory failure with hypoxia ([facility])    Snoring    Allergic rhinitis    Elevated LFTs    Hep C w/o coma, chronic (HCC)       Current Outpatient Medications:     acetaminophen (TYLENOL) 325 mg tablet, Take 650 mg by mouth every 6 (six) hours as needed for mild pain, Disp: , Rfl:     cetirizine (ZyrTEC) 10 mg tablet, Take 10 mg by mouth daily, Disp: , Rfl:     fluticasone (FLONASE) 50 mcg/act nasal spray, 1 spray daily, Disp: , Rfl: 3    furosemide (LASIX) 40 mg tablet, Take 1 tablet (40 mg total) by mouth daily, Disp: 90 tablet, Rfl: 1    losartan (COZAAR) 25 mg tablet, Take 2 tablets (50 mg total) by mouth daily, Disp: 180 tablet, Rfl: 1    Milk Thistle 175 MG CAPS, Take by mouth daily, Disp: , Rfl:     morphine (MS CONTIN) 15 mg 12 hr tablet, Take 15 mg by mouth 2 (two) times a day, Disp: , Rfl:     mycophenolate (CELLCEPT) 500 mg tablet, Take 2,000 mg by mouth every 12 (twelve) hours, Disp: , Rfl:     Nerve Stimulator (TENS THERAPY PAIN RELIEF) DAVID, Use as directed, Disp: 1 Device, Rfl: 0    Nerve Stimulator (TENS THERAPY PAIN RELIEF) DAVID, Use as directed, Disp: 1 Device, Rfl: 0    Nutritional Supplements (RESTORE-X PO), Take by mouth daily, Disp: , Rfl:     Omega-3 Fatty Acids (FISH OIL) 1,000 mg, Take 1,000 mg by mouth daily, Disp: , Rfl:     omeprazole (PriLOSEC) 40 MG capsule, Take 40 mg by mouth daily, Disp: , Rfl: 3    predniSONE 10 mg tablet, Take 10 mg by mouth daily, Disp: , Rfl:     Psyllium ([employer] NATURAL DAILY FIBER) 48 57 % POWD, Take by mouth, Disp: , Rfl:   Social History     Socioeconomic History    Marital status: Unknown     Spouse name: Not on file    Number of children: Not on file    Years of education: Not on file    Highest education level: Not on file   Occupational History    Not on file   Social Needs    Financial resource strain: Not on file    Food insecurity:     Worry: Not on file     Inability: Not on file    Transportation needs:     Medical: Not on file     Non-medical: Not on file   Tobacco Use    Smoking status: Former Smoker     Packs/day: 0 50     Years: 50 00     Pack years: 25 00     Types: Cigarettes     Last attempt to quit: 2/12/2009     Years since quitting: 10 7    Smokeless tobacco: Never Used    Tobacco comment: smoking on an off    Substance and Sexual Activity    Alcohol use: Not Currently     Frequency: Never     Comment: social    Drug use: No    Sexual activity: Not Currently     Partners: Female   Lifestyle    Physical activity:     Days per week: Not on file     Minutes per session: Not on file    Stress: Not on file   Relationships    Social connections:     Talks on phone: Not on file     Gets together: Not on file     Attends Cheondoism service: Not on file     Active member of club or organization: Not on file     Attends meetings of clubs or organizations: Not on file     Relationship status: Not on file    Intimate partner violence:     Fear of current or ex partner: Not on file     Emotionally abused: Not on file     Physically abused: Not on file     Forced sexual activity: Not on file   Other Topics Concern    Not on file   Social History Narrative    Not on file     Family History   Problem Relation Age of Onset    Cancer Mother     Cholecystitis Father     Emphysema Father     Drug abuse Brother     Stroke Family      Ear cerumen removal  Date/Time: 11/7/2019 1:38 PM  Performed by: MIRELLA Starr  Authorized by: MIRELLA Starr     Patient location:  Clinic  Indications / Diagnosis:  Bilateral cerumen impaction  Consent:     Consent obtained:  Verbal    Consent given by:  Patient    Risks discussed:  Bleeding, dizziness, infection, incomplete removal and TM perforation    Alternatives discussed:  No treatment  Universal protocol:     Procedure explained and questions answered to patient or proxy's satisfaction: yes      Patient identity confirmed:  Verbally with patient  Procedure details:     Local anesthetic:  None    Location:  L ear and R ear    Procedure type: irrigation with insturmentation      Insturmentation: curette      Approach:  Natural orifice  Post-procedure details:     Complication:  None    Hearing quality:  Normal    Patient tolerance of procedure:   Tolerated well, no immediate complications

## 2020-01-04 DIAGNOSIS — I10 ESSENTIAL HYPERTENSION: ICD-10-CM

## 2020-01-06 DIAGNOSIS — I10 ESSENTIAL HYPERTENSION: ICD-10-CM

## 2020-01-06 RX ORDER — LOSARTAN POTASSIUM 50 MG/1
TABLET ORAL
Qty: 90 TABLET | Refills: 0 | OUTPATIENT
Start: 2020-01-06

## 2020-01-06 RX ORDER — LOSARTAN POTASSIUM 25 MG/1
50 TABLET ORAL DAILY
Qty: 180 TABLET | Refills: 1 | Status: SHIPPED | OUTPATIENT
Start: 2020-01-06

## 2020-03-12 ENCOUNTER — TELEPHONE (OUTPATIENT)
Dept: FAMILY MEDICINE CLINIC | Facility: CLINIC | Age: 75
End: 2020-03-12

## 2020-03-12 NOTE — TELEPHONE ENCOUNTER
Patients wife called  She had wanted to talk with someone regarding patients blood pressure medication  He is now in Hospice  She said his blood pressure has been consistently low  She knows that patient is on Losartan and didn't know if he should continue taking it or if she could cut the Rx in half  The blood pressure has been in the 90/70's and can be 88/52  I told her I would check with you and someone would be calling her back  [phone]   Thank you

## 2020-03-14 DIAGNOSIS — I10 ESSENTIAL HYPERTENSION: ICD-10-CM

## 2020-03-15 RX ORDER — CANDESARTAN 8 MG/1
TABLET ORAL
Refills: 0 | OUTPATIENT
Start: 2020-03-15

## 2020-03-17 DIAGNOSIS — I10 ESSENTIAL HYPERTENSION: ICD-10-CM

## 2020-03-17 RX ORDER — CANDESARTAN 8 MG/1
TABLET ORAL
Refills: 0 | OUTPATIENT
Start: 2020-03-17

## (undated) DEVICE — TRAP SPEC SUCT 4 CAPTURE CHMBR LF

## (undated) DEVICE — SYRINGE 50ML LL

## (undated) DEVICE — 1200CC GUARDIAN II: Brand: GUARDIAN

## (undated) DEVICE — SINGLE-USE POLYPECTOMY SNARE: Brand: SENSATION SHORT THROW

## (undated) DEVICE — SYRINGE 30ML LL

## (undated) DEVICE — TUBING SUCTION 5MM X 12 FT